# Patient Record
Sex: FEMALE | Race: WHITE | NOT HISPANIC OR LATINO | Employment: OTHER | RURAL
[De-identification: names, ages, dates, MRNs, and addresses within clinical notes are randomized per-mention and may not be internally consistent; named-entity substitution may affect disease eponyms.]

---

## 2020-07-28 ENCOUNTER — HISTORICAL (OUTPATIENT)
Dept: ADMINISTRATIVE | Facility: HOSPITAL | Age: 60
End: 2020-07-28

## 2020-08-20 ENCOUNTER — HISTORICAL (OUTPATIENT)
Dept: ADMINISTRATIVE | Facility: HOSPITAL | Age: 60
End: 2020-08-20

## 2020-08-21 LAB
INSULIN SERPL-ACNC: NORMAL U[IU]/ML
LAB AP CLINICAL INFORMATION: NORMAL
LAB AP COMMENTS: NORMAL
LAB AP DIAGNOSIS - HISTORICAL: NORMAL
LAB AP GROSS PATHOLOGY - HISTORICAL: NORMAL
LAB AP SPECIMEN SUBMITTED - HISTORICAL: NORMAL

## 2020-09-04 ENCOUNTER — HISTORICAL (OUTPATIENT)
Dept: ADMINISTRATIVE | Facility: HOSPITAL | Age: 60
End: 2020-09-04

## 2020-09-09 ENCOUNTER — HISTORICAL (OUTPATIENT)
Dept: ADMINISTRATIVE | Facility: HOSPITAL | Age: 60
End: 2020-09-09

## 2020-09-09 LAB — CRC RECOMMENDATION EXT: NORMAL

## 2020-09-10 LAB
GASTRIN SERPL-MCNC: 300 PG/ML
INSULIN SERPL-ACNC: NORMAL U[IU]/ML
LAB AP CLINICAL INFORMATION: NORMAL
LAB AP DIAGNOSIS - HISTORICAL: NORMAL
LAB AP GROSS PATHOLOGY - HISTORICAL: NORMAL
LAB AP SPECIMEN SUBMITTED - HISTORICAL: NORMAL

## 2020-09-11 LAB — PCA IGG SER-ACNC: 114.7 U

## 2020-09-25 ENCOUNTER — HISTORICAL (OUTPATIENT)
Dept: ADMINISTRATIVE | Facility: HOSPITAL | Age: 60
End: 2020-09-25

## 2020-09-25 LAB — VIT B12 SERPL-MCNC: 235 PG/ML (ref 193–986)

## 2021-06-10 RX ORDER — MELOXICAM 7.5 MG/1
7.5 TABLET ORAL DAILY PRN
COMMUNITY
End: 2023-06-20 | Stop reason: ALTCHOICE

## 2021-06-10 RX ORDER — LOSARTAN POTASSIUM 50 MG/1
50 TABLET ORAL DAILY
COMMUNITY
End: 2023-06-20 | Stop reason: SDUPTHER

## 2021-06-10 RX ORDER — OMEPRAZOLE 20 MG/1
20 CAPSULE, DELAYED RELEASE ORAL DAILY
COMMUNITY
End: 2021-12-14 | Stop reason: SDUPTHER

## 2021-06-10 RX ORDER — PRAVASTATIN SODIUM 20 MG/1
20 TABLET ORAL NIGHTLY
COMMUNITY
End: 2021-06-17

## 2021-06-10 RX ORDER — FERROUS SULFATE 325(65) MG
325 TABLET, DELAYED RELEASE (ENTERIC COATED) ORAL 2 TIMES DAILY
COMMUNITY
End: 2021-12-14

## 2021-12-14 ENCOUNTER — OFFICE VISIT (OUTPATIENT)
Dept: FAMILY MEDICINE | Facility: CLINIC | Age: 61
End: 2021-12-14
Payer: MEDICARE

## 2021-12-14 VITALS
HEIGHT: 67 IN | DIASTOLIC BLOOD PRESSURE: 84 MMHG | HEART RATE: 101 BPM | WEIGHT: 117.81 LBS | TEMPERATURE: 98 F | SYSTOLIC BLOOD PRESSURE: 138 MMHG | RESPIRATION RATE: 20 BRPM | OXYGEN SATURATION: 99 % | BODY MASS INDEX: 18.49 KG/M2

## 2021-12-14 DIAGNOSIS — Z12.31 ENCOUNTER FOR SCREENING MAMMOGRAM FOR MALIGNANT NEOPLASM OF BREAST: ICD-10-CM

## 2021-12-14 DIAGNOSIS — D64.9 ANEMIA, UNSPECIFIED TYPE: ICD-10-CM

## 2021-12-14 DIAGNOSIS — N30.00 ACUTE CYSTITIS WITHOUT HEMATURIA: ICD-10-CM

## 2021-12-14 DIAGNOSIS — I10 HYPERTENSION, UNSPECIFIED TYPE: ICD-10-CM

## 2021-12-14 DIAGNOSIS — Z12.39 ENCOUNTER FOR SCREENING FOR MALIGNANT NEOPLASM OF BREAST, UNSPECIFIED SCREENING MODALITY: ICD-10-CM

## 2021-12-14 DIAGNOSIS — R30.0 DYSURIA: ICD-10-CM

## 2021-12-14 DIAGNOSIS — R10.13 EPIGASTRIC PAIN: Primary | ICD-10-CM

## 2021-12-14 LAB
ALBUMIN SERPL BCP-MCNC: 3.7 G/DL (ref 3.5–5)
ALBUMIN/GLOB SERPL: 0.9 {RATIO}
ALP SERPL-CCNC: 94 U/L (ref 50–130)
ALT SERPL W P-5'-P-CCNC: 32 U/L (ref 13–56)
ANION GAP SERPL CALCULATED.3IONS-SCNC: 9 MMOL/L (ref 7–16)
ANISOCYTOSIS BLD QL SMEAR: ABNORMAL
AST SERPL W P-5'-P-CCNC: 22 U/L (ref 15–37)
BASOPHILS # BLD AUTO: 0.07 K/UL (ref 0–0.2)
BASOPHILS NFR BLD AUTO: 1.5 % (ref 0–1)
BILIRUB SERPL-MCNC: 0.5 MG/DL (ref 0–1.2)
BILIRUB SERPL-MCNC: NEGATIVE MG/DL
BLOOD URINE, POC: NEGATIVE
BUN SERPL-MCNC: 16 MG/DL (ref 7–18)
BUN/CREAT SERPL: 24 (ref 6–20)
CALCIUM SERPL-MCNC: 8.8 MG/DL (ref 8.5–10.1)
CHLORIDE SERPL-SCNC: 108 MMOL/L (ref 98–107)
CHOLEST SERPL-MCNC: 205 MG/DL (ref 0–200)
CHOLEST/HDLC SERPL: 3.7 {RATIO}
CK SERPL-CCNC: 29 U/L (ref 26–192)
CO2 SERPL-SCNC: 28 MMOL/L (ref 21–32)
COLOR, POC UA: YELLOW
CREAT SERPL-MCNC: 0.68 MG/DL (ref 0.55–1.02)
DIFFERENTIAL METHOD BLD: ABNORMAL
EOSINOPHIL # BLD AUTO: 0.05 K/UL (ref 0–0.5)
EOSINOPHIL NFR BLD AUTO: 1.1 % (ref 1–4)
ERYTHROCYTE [DISTWIDTH] IN BLOOD BY AUTOMATED COUNT: 17.5 % (ref 11.5–14.5)
GLOBULIN SER-MCNC: 4 G/DL (ref 2–4)
GLUCOSE SERPL-MCNC: 96 MG/DL (ref 74–106)
GLUCOSE UR QL STRIP: NEGATIVE
HCT VFR BLD AUTO: 28.6 % (ref 38–47)
HDLC SERPL-MCNC: 55 MG/DL (ref 40–60)
HGB BLD-MCNC: 7.9 G/DL (ref 12–16)
HYPOCHROMIA BLD QL SMEAR: ABNORMAL
IMM GRANULOCYTES # BLD AUTO: 0.01 K/UL (ref 0–0.04)
IMM GRANULOCYTES NFR BLD: 0.2 % (ref 0–0.4)
KETONES UR QL STRIP: NEGATIVE
LDLC SERPL CALC-MCNC: 126 MG/DL
LDLC/HDLC SERPL: 2.3 {RATIO}
LEUKOCYTE ESTERASE URINE, POC: ABNORMAL
LYMPHOCYTES # BLD AUTO: 1.12 K/UL (ref 1–4.8)
LYMPHOCYTES NFR BLD AUTO: 24.7 % (ref 27–41)
MCH RBC QN AUTO: 18.3 PG (ref 27–31)
MCHC RBC AUTO-ENTMCNC: 27.6 G/DL (ref 32–36)
MCV RBC AUTO: 66.4 FL (ref 80–96)
MICROCYTES BLD QL SMEAR: ABNORMAL
MONOCYTES # BLD AUTO: 0.37 K/UL (ref 0–0.8)
MONOCYTES NFR BLD AUTO: 8.1 % (ref 2–6)
MPC BLD CALC-MCNC: 11.6 FL (ref 9.4–12.4)
NEUTROPHILS # BLD AUTO: 2.92 K/UL (ref 1.8–7.7)
NEUTROPHILS NFR BLD AUTO: 64.4 % (ref 53–65)
NITRITE, POC UA: POSITIVE
NONHDLC SERPL-MCNC: 150 MG/DL
NRBC # BLD AUTO: 0 X10E3/UL
NRBC, AUTO (.00): 0 %
OVALOCYTES BLD QL SMEAR: ABNORMAL
PH, POC UA: 5.5
PLATELET # BLD AUTO: 425 K/UL (ref 150–400)
PLATELET MORPHOLOGY: ABNORMAL
POLYCHROMASIA BLD QL SMEAR: ABNORMAL
POTASSIUM SERPL-SCNC: 3.6 MMOL/L (ref 3.5–5.1)
PROT SERPL-MCNC: 7.7 G/DL (ref 6.4–8.2)
PROTEIN, POC: ABNORMAL
RBC # BLD AUTO: 4.31 M/UL (ref 4.2–5.4)
SCHISTOCYTES BLD QL AUTO: ABNORMAL
SODIUM SERPL-SCNC: 141 MMOL/L (ref 136–145)
SPECIFIC GRAVITY, POC UA: >1.03
TRIGL SERPL-MCNC: 121 MG/DL (ref 35–150)
UROBILINOGEN, POC UA: 0.2
VLDLC SERPL-MCNC: 24 MG/DL
WBC # BLD AUTO: 4.54 K/UL (ref 4.5–11)

## 2021-12-14 PROCEDURE — 80053 COMPREHEN METABOLIC PANEL: CPT | Mod: ,,, | Performed by: CLINICAL MEDICAL LABORATORY

## 2021-12-14 PROCEDURE — 82550 ASSAY OF CK (CPK): CPT | Mod: ,,, | Performed by: CLINICAL MEDICAL LABORATORY

## 2021-12-14 PROCEDURE — 85025 COMPLETE CBC W/AUTO DIFF WBC: CPT | Mod: ,,, | Performed by: CLINICAL MEDICAL LABORATORY

## 2021-12-14 PROCEDURE — 99214 PR OFFICE/OUTPT VISIT, EST, LEVL IV, 30-39 MIN: ICD-10-PCS | Mod: ,,, | Performed by: NURSE PRACTITIONER

## 2021-12-14 PROCEDURE — 80061 LIPID PANEL: ICD-10-PCS | Mod: ,,, | Performed by: CLINICAL MEDICAL LABORATORY

## 2021-12-14 PROCEDURE — 81003 URINALYSIS AUTO W/O SCOPE: CPT | Mod: RHCUB | Performed by: NURSE PRACTITIONER

## 2021-12-14 PROCEDURE — 80061 LIPID PANEL: CPT | Mod: ,,, | Performed by: CLINICAL MEDICAL LABORATORY

## 2021-12-14 PROCEDURE — 82550 CK: ICD-10-PCS | Mod: ,,, | Performed by: CLINICAL MEDICAL LABORATORY

## 2021-12-14 PROCEDURE — 4010F ACE/ARB THERAPY RXD/TAKEN: CPT | Mod: ,,, | Performed by: NURSE PRACTITIONER

## 2021-12-14 PROCEDURE — 99214 OFFICE O/P EST MOD 30 MIN: CPT | Mod: ,,, | Performed by: NURSE PRACTITIONER

## 2021-12-14 PROCEDURE — 80053 COMPREHENSIVE METABOLIC PANEL: ICD-10-PCS | Mod: ,,, | Performed by: CLINICAL MEDICAL LABORATORY

## 2021-12-14 PROCEDURE — 85025 CBC WITH DIFFERENTIAL: ICD-10-PCS | Mod: ,,, | Performed by: CLINICAL MEDICAL LABORATORY

## 2021-12-14 PROCEDURE — 4010F PR ACE/ARB THEARPY RXD/TAKEN: ICD-10-PCS | Mod: ,,, | Performed by: NURSE PRACTITIONER

## 2021-12-14 RX ORDER — NAPROXEN 500 MG/1
TABLET ORAL
COMMUNITY
End: 2023-06-20

## 2021-12-14 RX ORDER — METHOCARBAMOL 500 MG/1
TABLET, FILM COATED ORAL
COMMUNITY
End: 2023-06-20 | Stop reason: ALTCHOICE

## 2021-12-14 RX ORDER — NITROFURANTOIN (MACROCRYSTALS) 100 MG/1
CAPSULE ORAL
COMMUNITY
End: 2021-12-14 | Stop reason: ALTCHOICE

## 2021-12-14 RX ORDER — PRAVASTATIN SODIUM 20 MG/1
TABLET ORAL
COMMUNITY
End: 2021-12-14 | Stop reason: ALTCHOICE

## 2021-12-14 RX ORDER — NITROFURANTOIN 25; 75 MG/1; MG/1
100 CAPSULE ORAL 2 TIMES DAILY
Qty: 20 CAPSULE | Refills: 0 | Status: SHIPPED | OUTPATIENT
Start: 2021-12-14 | End: 2022-09-28 | Stop reason: ALTCHOICE

## 2021-12-14 RX ORDER — OMEPRAZOLE 20 MG/1
CAPSULE, DELAYED RELEASE ORAL
Qty: 45 CAPSULE | Refills: 5 | Status: SHIPPED | OUTPATIENT
Start: 2021-12-14 | End: 2022-08-31 | Stop reason: SDUPTHER

## 2021-12-14 RX ORDER — LOSARTAN POTASSIUM 25 MG/1
TABLET ORAL
COMMUNITY
End: 2021-12-14 | Stop reason: ALTCHOICE

## 2022-08-31 ENCOUNTER — OFFICE VISIT (OUTPATIENT)
Dept: FAMILY MEDICINE | Facility: CLINIC | Age: 62
End: 2022-08-31
Payer: MEDICAID

## 2022-08-31 VITALS
BODY MASS INDEX: 18.83 KG/M2 | HEART RATE: 108 BPM | RESPIRATION RATE: 20 BRPM | DIASTOLIC BLOOD PRESSURE: 80 MMHG | WEIGHT: 120 LBS | TEMPERATURE: 99 F | OXYGEN SATURATION: 98 % | HEIGHT: 67 IN | SYSTOLIC BLOOD PRESSURE: 134 MMHG

## 2022-08-31 DIAGNOSIS — K21.9 GASTROESOPHAGEAL REFLUX DISEASE WITHOUT ESOPHAGITIS: ICD-10-CM

## 2022-08-31 DIAGNOSIS — E78.00 HYPERCHOLESTEREMIA: ICD-10-CM

## 2022-08-31 DIAGNOSIS — D64.9 ANEMIA, UNSPECIFIED TYPE: ICD-10-CM

## 2022-08-31 DIAGNOSIS — S29.012A MUSCLE STRAIN OF LEFT UPPER BACK, INITIAL ENCOUNTER: ICD-10-CM

## 2022-08-31 DIAGNOSIS — G25.81 RESTLESS LEG SYNDROME: ICD-10-CM

## 2022-08-31 DIAGNOSIS — I10 HYPERTENSION, UNSPECIFIED TYPE: Primary | ICD-10-CM

## 2022-08-31 LAB
ALBUMIN SERPL BCP-MCNC: 4.2 G/DL (ref 3.5–5)
ALBUMIN/GLOB SERPL: 1.2 {RATIO}
ALP SERPL-CCNC: 85 U/L (ref 50–130)
ALT SERPL W P-5'-P-CCNC: 32 U/L (ref 13–56)
ANION GAP SERPL CALCULATED.3IONS-SCNC: 12 MMOL/L (ref 7–16)
ANISOCYTOSIS BLD QL SMEAR: ABNORMAL
AST SERPL W P-5'-P-CCNC: 23 U/L (ref 15–37)
BASOPHILS # BLD AUTO: 0.06 K/UL (ref 0–0.2)
BASOPHILS NFR BLD AUTO: 1.3 % (ref 0–1)
BILIRUB SERPL-MCNC: 0.5 MG/DL (ref ?–1.2)
BUN SERPL-MCNC: 13 MG/DL (ref 7–18)
BUN/CREAT SERPL: 18 (ref 6–20)
CALCIUM SERPL-MCNC: 9.3 MG/DL (ref 8.5–10.1)
CHLORIDE SERPL-SCNC: 105 MMOL/L (ref 98–107)
CHOLEST SERPL-MCNC: 198 MG/DL (ref 0–200)
CHOLEST/HDLC SERPL: 3.2 {RATIO}
CK SERPL-CCNC: 37 U/L (ref 26–192)
CO2 SERPL-SCNC: 26 MMOL/L (ref 21–32)
CREAT SERPL-MCNC: 0.74 MG/DL (ref 0.55–1.02)
DIFFERENTIAL METHOD BLD: ABNORMAL
EGFR (NO RACE VARIABLE) (RUSH/TITUS): 91 ML/MIN/1.73M²
EOSINOPHIL # BLD AUTO: 0.03 K/UL (ref 0–0.5)
EOSINOPHIL NFR BLD AUTO: 0.7 % (ref 1–4)
ERYTHROCYTE [DISTWIDTH] IN BLOOD BY AUTOMATED COUNT: 18.7 % (ref 11.5–14.5)
GLOBULIN SER-MCNC: 3.5 G/DL (ref 2–4)
GLUCOSE SERPL-MCNC: 112 MG/DL (ref 74–106)
HCT VFR BLD AUTO: 29.4 % (ref 38–47)
HDLC SERPL-MCNC: 62 MG/DL (ref 40–60)
HGB BLD-MCNC: 7.9 G/DL (ref 12–16)
HYPOCHROMIA BLD QL SMEAR: ABNORMAL
IMM GRANULOCYTES # BLD AUTO: 0.02 K/UL (ref 0–0.04)
IMM GRANULOCYTES NFR BLD: 0.4 % (ref 0–0.4)
LDLC SERPL CALC-MCNC: 109 MG/DL
LDLC/HDLC SERPL: 1.8 {RATIO}
LYMPHOCYTES # BLD AUTO: 1.17 K/UL (ref 1–4.8)
LYMPHOCYTES NFR BLD AUTO: 25.8 % (ref 27–41)
MCH RBC QN AUTO: 17.8 PG (ref 27–31)
MCHC RBC AUTO-ENTMCNC: 26.9 G/DL (ref 32–36)
MCV RBC AUTO: 66.1 FL (ref 80–96)
MICROCYTES BLD QL SMEAR: ABNORMAL
MONOCYTES # BLD AUTO: 0.4 K/UL (ref 0–0.8)
MONOCYTES NFR BLD AUTO: 8.8 % (ref 2–6)
MPC BLD CALC-MCNC: 12 FL (ref 9.4–12.4)
NEUTROPHILS # BLD AUTO: 2.85 K/UL (ref 1.8–7.7)
NEUTROPHILS NFR BLD AUTO: 63 % (ref 53–65)
NONHDLC SERPL-MCNC: 136 MG/DL
NRBC # BLD AUTO: 0 X10E3/UL
NRBC, AUTO (.00): 0 %
OVALOCYTES BLD QL SMEAR: ABNORMAL
PLATELET # BLD AUTO: 497 K/UL (ref 150–400)
PLATELET MORPHOLOGY: ABNORMAL
POLYCHROMASIA BLD QL SMEAR: ABNORMAL
POTASSIUM SERPL-SCNC: 4.6 MMOL/L (ref 3.5–5.1)
PROT SERPL-MCNC: 7.7 G/DL (ref 6.4–8.2)
RBC # BLD AUTO: 4.45 M/UL (ref 4.2–5.4)
SCHISTOCYTES BLD QL AUTO: ABNORMAL
SODIUM SERPL-SCNC: 138 MMOL/L (ref 136–145)
TRIGL SERPL-MCNC: 133 MG/DL (ref 35–150)
VLDLC SERPL-MCNC: 27 MG/DL
WBC # BLD AUTO: 4.53 K/UL (ref 4.5–11)

## 2022-08-31 PROCEDURE — 3075F PR MOST RECENT SYSTOLIC BLOOD PRESS GE 130-139MM HG: ICD-10-PCS | Mod: ,,, | Performed by: NURSE PRACTITIONER

## 2022-08-31 PROCEDURE — 85025 COMPLETE CBC W/AUTO DIFF WBC: CPT | Mod: ,,, | Performed by: CLINICAL MEDICAL LABORATORY

## 2022-08-31 PROCEDURE — 82550 ASSAY OF CK (CPK): CPT | Mod: ,,, | Performed by: CLINICAL MEDICAL LABORATORY

## 2022-08-31 PROCEDURE — 80053 COMPREHEN METABOLIC PANEL: CPT | Mod: ,,, | Performed by: CLINICAL MEDICAL LABORATORY

## 2022-08-31 PROCEDURE — 3008F BODY MASS INDEX DOCD: CPT | Mod: ,,, | Performed by: NURSE PRACTITIONER

## 2022-08-31 PROCEDURE — 80053 COMPREHENSIVE METABOLIC PANEL: ICD-10-PCS | Mod: ,,, | Performed by: CLINICAL MEDICAL LABORATORY

## 2022-08-31 PROCEDURE — 3079F PR MOST RECENT DIASTOLIC BLOOD PRESSURE 80-89 MM HG: ICD-10-PCS | Mod: ,,, | Performed by: NURSE PRACTITIONER

## 2022-08-31 PROCEDURE — 3075F SYST BP GE 130 - 139MM HG: CPT | Mod: ,,, | Performed by: NURSE PRACTITIONER

## 2022-08-31 PROCEDURE — 36415 COLL VENOUS BLD VENIPUNCTURE: CPT | Mod: ,,, | Performed by: CLINICAL MEDICAL LABORATORY

## 2022-08-31 PROCEDURE — 36415 PR COLLECTION VENOUS BLOOD,VENIPUNCTURE: ICD-10-PCS | Mod: ,,, | Performed by: CLINICAL MEDICAL LABORATORY

## 2022-08-31 PROCEDURE — 99214 PR OFFICE/OUTPT VISIT, EST, LEVL IV, 30-39 MIN: ICD-10-PCS | Mod: ,,, | Performed by: NURSE PRACTITIONER

## 2022-08-31 PROCEDURE — 3079F DIAST BP 80-89 MM HG: CPT | Mod: ,,, | Performed by: NURSE PRACTITIONER

## 2022-08-31 PROCEDURE — 3008F PR BODY MASS INDEX (BMI) DOCUMENTED: ICD-10-PCS | Mod: ,,, | Performed by: NURSE PRACTITIONER

## 2022-08-31 PROCEDURE — 1159F PR MEDICATION LIST DOCUMENTED IN MEDICAL RECORD: ICD-10-PCS | Mod: ,,, | Performed by: NURSE PRACTITIONER

## 2022-08-31 PROCEDURE — 80061 LIPID PANEL: ICD-10-PCS | Mod: ,,, | Performed by: CLINICAL MEDICAL LABORATORY

## 2022-08-31 PROCEDURE — 82550 CK: ICD-10-PCS | Mod: ,,, | Performed by: CLINICAL MEDICAL LABORATORY

## 2022-08-31 PROCEDURE — 1159F MED LIST DOCD IN RCRD: CPT | Mod: ,,, | Performed by: NURSE PRACTITIONER

## 2022-08-31 PROCEDURE — 85025 CBC WITH DIFFERENTIAL: ICD-10-PCS | Mod: ,,, | Performed by: CLINICAL MEDICAL LABORATORY

## 2022-08-31 PROCEDURE — 99214 OFFICE O/P EST MOD 30 MIN: CPT | Mod: ,,, | Performed by: NURSE PRACTITIONER

## 2022-08-31 PROCEDURE — 80061 LIPID PANEL: CPT | Mod: ,,, | Performed by: CLINICAL MEDICAL LABORATORY

## 2022-08-31 RX ORDER — DICLOFENAC SODIUM 75 MG/1
75 TABLET, DELAYED RELEASE ORAL 2 TIMES DAILY
Qty: 30 TABLET | Refills: 0 | Status: SHIPPED | OUTPATIENT
Start: 2022-08-31 | End: 2023-06-07

## 2022-08-31 RX ORDER — PRAVASTATIN SODIUM 20 MG/1
20 TABLET ORAL DAILY
Qty: 90 TABLET | Refills: 1 | Status: SHIPPED | OUTPATIENT
Start: 2022-08-31 | End: 2023-04-19

## 2022-08-31 RX ORDER — CYCLOBENZAPRINE HCL 5 MG
5 TABLET ORAL 3 TIMES DAILY PRN
Qty: 30 TABLET | Refills: 0 | Status: SHIPPED | OUTPATIENT
Start: 2022-08-31 | End: 2022-09-10

## 2022-08-31 RX ORDER — PRAMIPEXOLE DIHYDROCHLORIDE 0.12 MG/1
TABLET ORAL
Qty: 49 TABLET | Refills: 0 | Status: SHIPPED | OUTPATIENT
Start: 2022-08-31 | End: 2022-09-28 | Stop reason: SDUPTHER

## 2022-08-31 RX ORDER — OMEPRAZOLE 20 MG/1
CAPSULE, DELAYED RELEASE ORAL
Qty: 90 CAPSULE | Refills: 1 | Status: SHIPPED | OUTPATIENT
Start: 2022-08-31 | End: 2023-04-19

## 2022-08-31 RX ORDER — PRAVASTATIN SODIUM 20 MG/1
20 TABLET ORAL DAILY
COMMUNITY
End: 2022-08-31 | Stop reason: SDUPTHER

## 2022-08-31 NOTE — PROGRESS NOTES
Sheba Grant NP   [facility]  [address] [address] beds at the [facility] [address]  [place]     PATIENT NAME: Arlette Cisneros  : 1960  DATE: 22  MRN: [id number]      Billing Provider: Sheba Grant NP  Level of Service:   Patient PCP Information       Provider PCP Type    Sheba Grant NP General            Reason for Visit / Chief Complaint: Hypertension (Here for checkup -  need refills on medications and labs) and Back Pain (C/o left lower side and back pain x 2 wks.  Denies any injury)       Update PCP  Update Chief Complaint         History of Present Illness / Problem Focused Workflow     Arlette Cisneros presents to the clinic here for htn, and also c/o back pain, left lower back pain and side pain x 2 weeks.       Review of Systems     Review of Systems   Constitutional:  Negative for chills, fatigue and fever.   HENT:  Negative for nasal congestion, ear pain, facial swelling, hearing loss, mouth dryness, mouth sores, postnasal drip, rhinorrhea, sinus pressure/congestion and goiter.    Eyes:  Negative for discharge and itching.   Respiratory:  Negative for cough, shortness of breath and wheezing.    Cardiovascular:  Negative for chest pain and leg swelling.   Gastrointestinal:  Negative for abdominal pain, change in bowel habit and change in bowel habit.   Genitourinary:  Negative for difficulty urinating, dysuria, enuresis, frequency, hematuria and urgency.   Musculoskeletal:  Positive for back pain and leg pain (and movement at night).        Left lat back pain   Neurological:  Negative for dizziness, vertigo, syncope, weakness and headaches.   Psychiatric/Behavioral:  Negative for decreased concentration.       Medical / Social / Family History     Past Medical History:   Diagnosis Date    Anemia     Cervical radiculopathy 2018    Degenerative joint disease involving multiple joints 2020    Hypercholesteremia 2021    Hypertension     Restrictive lung disease 2021    Temporomandibular  "joint disorder 04/30/2018       Past Surgical History:   Procedure Laterality Date    APPENDECTOMY      HYSTERECTOMY         Social History  Ms.  reports that she has never smoked. She has never used smokeless tobacco. She reports that she does not drink alcohol and does not use drugs.    Family History  Ms.'s family history includes Cancer in her father; Heart disease in her mother; Kidney disease in her father; Stroke in her mother.    Medications and Allergies     Medications  Outpatient Medications Marked as Taking for the 8/31/22 encounter (Office Visit) with Sheba Grant NP   Medication Sig Dispense Refill    [DISCONTINUED] omeprazole (PRILOSEC) 20 MG capsule 1 tablet po bid x 2 weeks and then may decrfease to daily 45 capsule 5    [DISCONTINUED] pravastatin (PRAVACHOL) 20 MG tablet Take 20 mg by mouth once daily.         Allergies  Review of patient's allergies indicates:   Allergen Reactions    Codeine sulfate Shortness Of Breath       Physical Examination     Vitals:    08/31/22 1459   BP: 134/80   BP Location: Left arm   Patient Position: Sitting   BP Method: Medium (Manual)   Pulse: 108   Resp: 20   Temp: 99.3 °F (37.4 °C)   TempSrc: Oral   SpO2: 98%   Weight: 54.4 kg (120 lb)   Height: 5' 6.54" (1.69 m)      Physical Exam  Vitals and nursing note reviewed.   Constitutional:       Appearance: Normal appearance.   HENT:      Head: Normocephalic.      Right Ear: Tympanic membrane, ear canal and external ear normal.      Left Ear: Tympanic membrane, ear canal and external ear normal.      Nose: Nose normal.      Mouth/Throat:      Mouth: Mucous membranes are moist.      Pharynx: Oropharynx is clear.   Eyes:      Extraocular Movements: Extraocular movements intact.      Conjunctiva/sclera: Conjunctivae normal.      Pupils: Pupils are equal, round, and reactive to light.   Cardiovascular:      Rate and Rhythm: Normal rate and regular rhythm.      Pulses: Normal pulses.      Heart sounds: Normal heart " sounds.   Pulmonary:      Effort: Pulmonary effort is normal.      Breath sounds: Normal breath sounds.   Abdominal:      General: Bowel sounds are normal.      Palpations: Abdomen is soft.   Musculoskeletal:         General: Normal range of motion.      Cervical back: Normal range of motion and neck supple.      Comments: Mild tenderness left lat back, mild muscle spasm   Skin:     General: Skin is warm and dry.      Capillary Refill: Capillary refill takes less than 2 seconds.   Neurological:      General: No focal deficit present.      Mental Status: She is alert and oriented to person, place, and time.   Psychiatric:         Mood and Affect: Mood normal.         Behavior: Behavior normal.        Assessment and Plan (including Health Maintenance)      Problem List  Smart Sets  Document Outside HM   :    Plan: alternate heat and cold to back several times per day for comfort, avoid lifting more than 5 pounds for 1 weeks, will try mirapex for restless legs, meds as ordered, return to clnic as needed and as scheduled    Hypertension, unspecified type  -     Comprehensive Metabolic Panel; Future; Expected date: 08/31/2022  -     CK; Future; Expected date: 08/31/2022    Anemia, unspecified type  -     CBC Auto Differential; Future; Expected date: 08/31/2022    Hypercholesteremia  -     Comprehensive Metabolic Panel; Future; Expected date: 08/31/2022  -     CK; Future; Expected date: 08/31/2022  -     Lipid Panel; Future; Expected date: 08/31/2022  -     pravastatin (PRAVACHOL) 20 MG tablet; Take 1 tablet (20 mg total) by mouth once daily.  Dispense: 90 tablet; Refill: 1    Restless leg syndrome  -     pramipexole (MIRAPEX) 0.125 MG tablet; 1 po hs x 1 week, then may increase to 2 po hs if needed.  Dispense: 49 tablet; Refill: 0    Gastroesophageal reflux disease without esophagitis  -     omeprazole (PRILOSEC) 20 MG capsule; 1 tablet po bid x 2 weeks and then may decrfease to daily  Dispense: 90 capsule; Refill:  1    Muscle strain  -     diclofenac (VOLTAREN) 75 MG EC tablet; Take 1 tablet (75 mg total) by mouth 2 (two) times daily.  Dispense: 30 tablet; Refill: 0  -     cyclobenzaprine (FLEXERIL) 5 MG tablet; Take 1 tablet (5 mg total) by mouth 3 (three) times daily as needed for Muscle spasms. Do not drive, mix etoh, or operate any heavy equipment while taking med  Dispense: 30 tablet; Refill: 0          Health Maintenance Due   Topic Date Due    Hepatitis C Screening  Never done    HIV Screening  Never done    TETANUS VACCINE  Never done    Mammogram  Never done    Colorectal Cancer Screening  Never done    Shingles Vaccine (1 of 2) Never done       Problem List Items Addressed This Visit          Cardiac/Vascular    Hypertension - Primary    Relevant Orders    Comprehensive Metabolic Panel    CK       Oncology    Anemia    Relevant Orders    CBC Auto Differential     Other Visit Diagnoses       Hypercholesteremia        Relevant Medications    pravastatin (PRAVACHOL) 20 MG tablet    Other Relevant Orders    Comprehensive Metabolic Panel    CK    Lipid Panel    Restless leg syndrome        Relevant Medications    pramipexole (MIRAPEX) 0.125 MG tablet    Gastroesophageal reflux disease without esophagitis        Relevant Medications    omeprazole (PRILOSEC) 20 MG capsule    Muscle strain        Relevant Medications    diclofenac (VOLTAREN) 75 MG EC tablet    cyclobenzaprine (FLEXERIL) 5 MG tablet              Health Maintenance Topics with due status: Not Due       Topic Last Completion Date    Lipid Panel 12/14/2021    Influenza Vaccine Not Due       Procedures     Future Appointments   Date Time Provider Department Center   9/28/2022  2:45 PM Sheba Grant NP [facility]        Follow up in about 1 month (around 9/30/2022).       Signature:  Sheba Grant NP    Date of encounter: 8/31/22

## 2022-09-01 NOTE — PROGRESS NOTES
Notify that h/h is low but not changed since 8 months ago, increae irone in diet, and recheck in 3 months, cmp good, recheck in 6 months

## 2022-09-02 DIAGNOSIS — D64.9 ANEMIA, UNSPECIFIED TYPE: Primary | ICD-10-CM

## 2022-09-28 ENCOUNTER — OFFICE VISIT (OUTPATIENT)
Dept: FAMILY MEDICINE | Facility: CLINIC | Age: 62
End: 2022-09-28
Payer: MEDICARE

## 2022-09-28 VITALS
SYSTOLIC BLOOD PRESSURE: 128 MMHG | HEIGHT: 68 IN | BODY MASS INDEX: 18.34 KG/M2 | HEART RATE: 80 BPM | WEIGHT: 121 LBS | DIASTOLIC BLOOD PRESSURE: 74 MMHG | OXYGEN SATURATION: 99 % | TEMPERATURE: 97 F | RESPIRATION RATE: 18 BRPM

## 2022-09-28 DIAGNOSIS — D64.9 ANEMIA, UNSPECIFIED TYPE: ICD-10-CM

## 2022-09-28 DIAGNOSIS — M54.6 ACUTE LEFT-SIDED THORACIC BACK PAIN: Primary | ICD-10-CM

## 2022-09-28 DIAGNOSIS — R53.82 CHRONIC FATIGUE: ICD-10-CM

## 2022-09-28 DIAGNOSIS — G25.81 RESTLESS LEG SYNDROME: ICD-10-CM

## 2022-09-28 DIAGNOSIS — J98.4 RESTRICTIVE LUNG DISEASE: ICD-10-CM

## 2022-09-28 LAB
BASOPHILS # BLD AUTO: 0.07 K/UL (ref 0–0.2)
BASOPHILS NFR BLD AUTO: 1.5 % (ref 0–1)
DIFFERENTIAL METHOD BLD: ABNORMAL
EOSINOPHIL # BLD AUTO: 0.05 K/UL (ref 0–0.5)
EOSINOPHIL NFR BLD AUTO: 1.1 % (ref 1–4)
ERYTHROCYTE [DISTWIDTH] IN BLOOD BY AUTOMATED COUNT: 18.6 % (ref 11.5–14.5)
EST. AVERAGE GLUCOSE BLD GHB EST-MCNC: 110 MG/DL
FOLATE SERPL-MCNC: 17.3 NG/ML (ref 3.1–17.5)
HBA1C MFR BLD HPLC: 5.9 % (ref 4.5–6.6)
HCT VFR BLD AUTO: 29.2 % (ref 38–47)
HGB BLD-MCNC: 7.8 G/DL (ref 12–16)
HYPOCHROMIA BLD QL SMEAR: NORMAL
IMM GRANULOCYTES # BLD AUTO: 0.02 K/UL (ref 0–0.04)
IMM GRANULOCYTES NFR BLD: 0.4 % (ref 0–0.4)
LYMPHOCYTES # BLD AUTO: 1.27 K/UL (ref 1–4.8)
LYMPHOCYTES NFR BLD AUTO: 26.8 % (ref 27–41)
MCH RBC QN AUTO: 17.7 PG (ref 27–31)
MCHC RBC AUTO-ENTMCNC: 26.7 G/DL (ref 32–36)
MCV RBC AUTO: 66.4 FL (ref 80–96)
MICROCYTES BLD QL SMEAR: NORMAL
MONOCYTES # BLD AUTO: 0.46 K/UL (ref 0–0.8)
MONOCYTES NFR BLD AUTO: 9.7 % (ref 2–6)
MPC BLD CALC-MCNC: 11.7 FL (ref 9.4–12.4)
NEUTROPHILS # BLD AUTO: 2.86 K/UL (ref 1.8–7.7)
NEUTROPHILS NFR BLD AUTO: 60.5 % (ref 53–65)
NRBC # BLD AUTO: 0 X10E3/UL
NRBC, AUTO (.00): 0 %
PLATELET # BLD AUTO: 461 K/UL (ref 150–400)
PLATELET MORPHOLOGY: NORMAL
RBC # BLD AUTO: 4.4 M/UL (ref 4.2–5.4)
VIT B12 SERPL-MCNC: 629 PG/ML (ref 193–986)
WBC # BLD AUTO: 4.73 K/UL (ref 4.5–11)

## 2022-09-28 PROCEDURE — 82607 VITAMIN B12/FOLATE, SERUM PANEL: ICD-10-PCS | Mod: GZ,,, | Performed by: CLINICAL MEDICAL LABORATORY

## 2022-09-28 PROCEDURE — 82746 VITAMIN B12/FOLATE, SERUM PANEL: ICD-10-PCS | Mod: GZ,,, | Performed by: CLINICAL MEDICAL LABORATORY

## 2022-09-28 PROCEDURE — 85025 CBC WITH DIFFERENTIAL: ICD-10-PCS | Mod: ,,, | Performed by: CLINICAL MEDICAL LABORATORY

## 2022-09-28 PROCEDURE — 1159F MED LIST DOCD IN RCRD: CPT | Mod: ,,, | Performed by: NURSE PRACTITIONER

## 2022-09-28 PROCEDURE — 3074F PR MOST RECENT SYSTOLIC BLOOD PRESSURE < 130 MM HG: ICD-10-PCS | Mod: ,,, | Performed by: NURSE PRACTITIONER

## 2022-09-28 PROCEDURE — 3078F DIAST BP <80 MM HG: CPT | Mod: ,,, | Performed by: NURSE PRACTITIONER

## 2022-09-28 PROCEDURE — 85025 COMPLETE CBC W/AUTO DIFF WBC: CPT | Mod: ,,, | Performed by: CLINICAL MEDICAL LABORATORY

## 2022-09-28 PROCEDURE — 99214 OFFICE O/P EST MOD 30 MIN: CPT | Mod: 25,,, | Performed by: NURSE PRACTITIONER

## 2022-09-28 PROCEDURE — 1159F PR MEDICATION LIST DOCUMENTED IN MEDICAL RECORD: ICD-10-PCS | Mod: ,,, | Performed by: NURSE PRACTITIONER

## 2022-09-28 PROCEDURE — 96372 THER/PROPH/DIAG INJ SC/IM: CPT | Mod: ,,, | Performed by: NURSE PRACTITIONER

## 2022-09-28 PROCEDURE — 96372 PR INJECTION,THERAP/PROPH/DIAG2ST, IM OR SUBCUT: ICD-10-PCS | Mod: ,,, | Performed by: NURSE PRACTITIONER

## 2022-09-28 PROCEDURE — 3078F PR MOST RECENT DIASTOLIC BLOOD PRESSURE < 80 MM HG: ICD-10-PCS | Mod: ,,, | Performed by: NURSE PRACTITIONER

## 2022-09-28 PROCEDURE — 82607 VITAMIN B-12: CPT | Mod: GZ,,, | Performed by: CLINICAL MEDICAL LABORATORY

## 2022-09-28 PROCEDURE — 3008F PR BODY MASS INDEX (BMI) DOCUMENTED: ICD-10-PCS | Mod: ,,, | Performed by: NURSE PRACTITIONER

## 2022-09-28 PROCEDURE — 82746 ASSAY OF FOLIC ACID SERUM: CPT | Mod: GZ,,, | Performed by: CLINICAL MEDICAL LABORATORY

## 2022-09-28 PROCEDURE — 83036 HEMOGLOBIN GLYCOSYLATED A1C: CPT | Mod: GZ,,, | Performed by: CLINICAL MEDICAL LABORATORY

## 2022-09-28 PROCEDURE — 99214 PR OFFICE/OUTPT VISIT, EST, LEVL IV, 30-39 MIN: ICD-10-PCS | Mod: 25,,, | Performed by: NURSE PRACTITIONER

## 2022-09-28 PROCEDURE — 83036 HEMOGLOBIN A1C: ICD-10-PCS | Mod: GZ,,, | Performed by: CLINICAL MEDICAL LABORATORY

## 2022-09-28 PROCEDURE — 3008F BODY MASS INDEX DOCD: CPT | Mod: ,,, | Performed by: NURSE PRACTITIONER

## 2022-09-28 PROCEDURE — 3074F SYST BP LT 130 MM HG: CPT | Mod: ,,, | Performed by: NURSE PRACTITIONER

## 2022-09-28 RX ORDER — DICLOFENAC SODIUM 75 MG/1
75 TABLET, DELAYED RELEASE ORAL 2 TIMES DAILY PRN
Qty: 60 TABLET | Refills: 0 | Status: SHIPPED | OUTPATIENT
Start: 2022-09-28 | End: 2023-06-07

## 2022-09-28 RX ORDER — METHYLPREDNISOLONE ACETATE 40 MG/ML
40 INJECTION, SUSPENSION INTRA-ARTICULAR; INTRALESIONAL; INTRAMUSCULAR; SOFT TISSUE
Status: COMPLETED | OUTPATIENT
Start: 2022-09-28 | End: 2022-09-28

## 2022-09-28 RX ORDER — DEXAMETHASONE SODIUM PHOSPHATE 4 MG/ML
4 INJECTION, SOLUTION INTRA-ARTICULAR; INTRALESIONAL; INTRAMUSCULAR; INTRAVENOUS; SOFT TISSUE
Status: COMPLETED | OUTPATIENT
Start: 2022-09-28 | End: 2022-09-28

## 2022-09-28 RX ORDER — PRAMIPEXOLE DIHYDROCHLORIDE 0.12 MG/1
TABLET ORAL
Qty: 180 TABLET | Refills: 1 | Status: SHIPPED | OUTPATIENT
Start: 2022-09-28 | End: 2023-06-20 | Stop reason: ALTCHOICE

## 2022-09-28 RX ADMIN — DEXAMETHASONE SODIUM PHOSPHATE 4 MG: 4 INJECTION, SOLUTION INTRA-ARTICULAR; INTRALESIONAL; INTRAMUSCULAR; INTRAVENOUS; SOFT TISSUE at 03:09

## 2022-09-28 RX ADMIN — METHYLPREDNISOLONE ACETATE 40 MG: 40 INJECTION, SUSPENSION INTRA-ARTICULAR; INTRALESIONAL; INTRAMUSCULAR; SOFT TISSUE at 03:09

## 2022-09-28 NOTE — PROGRESS NOTES
Sheba Grant NP   [facility]  [address] [facility] [address]  [place]     PATIENT NAME: Arlette Cisneros  : 1960  DATE: 22  MRN: [id number]      Billing Provider: Sheba Grant NP  Level of Service:   Patient PCP Information       Provider PCP Type    Sheba Grant NP General            Reason for Visit / Chief Complaint: Follow-up (Restless legs, HTN, back pain)       Update PCP  Update Chief Complaint         History of Present Illness / Problem Focused Workflow     Arlette Cisneros presents to the clinic here for eval of rls, htn, back pain. Also c/o fatigue, states not eating real well      Review of Systems     Review of Systems   Constitutional:  Positive for fatigue. Negative for chills and fever.   HENT:  Negative for nasal congestion, ear pain, facial swelling, hearing loss, mouth dryness, mouth sores, postnasal drip, rhinorrhea, sinus pressure/congestion and goiter.    Eyes:  Negative for discharge and itching.   Respiratory:  Negative for cough, shortness of breath and wheezing.    Cardiovascular:  Negative for chest pain and leg swelling.   Gastrointestinal:  Negative for abdominal pain, change in bowel habit and change in bowel habit.   Genitourinary:  Negative for difficulty urinating, dysuria, enuresis, frequency, hematuria and urgency.   Musculoskeletal:  Positive for back pain (left lateral back pain).   Neurological:  Negative for dizziness, vertigo, syncope, weakness and headaches.   Psychiatric/Behavioral:  Negative for decreased concentration.       Medical / Social / Family History     Past Medical History:   Diagnosis Date    Anemia     Cervical radiculopathy 2018    Degenerative joint disease involving multiple joints 2020    Hypercholesteremia 2021    Hypertension     Restrictive lung disease 2021    Temporomandibular joint disorder 2018       Past Surgical History:   Procedure Laterality Date    APPENDECTOMY      HYSTERECTOMY         Social  "History  Ms.  reports that she has never smoked. She has never used smokeless tobacco. She reports that she does not drink alcohol and does not use drugs.    Family History  Ms.'s family history includes Cancer in her father; Heart disease in her mother; Kidney disease in her father; Stroke in her mother.    Medications and Allergies     Medications  Outpatient Medications Marked as Taking for the 9/28/22 encounter (Office Visit) with Sheba Grant NP   Medication Sig Dispense Refill    diclofenac (VOLTAREN) 75 MG EC tablet Take 1 tablet (75 mg total) by mouth 2 (two) times daily. 30 tablet 0    losartan (COZAAR) 50 MG tablet Take 50 mg by mouth once daily.      meloxicam (MOBIC) 7.5 MG tablet Take 7.5 mg by mouth daily as needed for Pain.      methocarbamoL (ROBAXIN) 500 MG Tab 1 tablet      naproxen (NAPROSYN) 500 MG tablet 1 tablet with food or milk as needed      omeprazole (PRILOSEC) 20 MG capsule 1 tablet po bid x 2 weeks and then may decrfease to daily 90 capsule 1    pramipexole (MIRAPEX) 0.125 MG tablet 1 po hs x 1 week, then may increase to 2 po hs if needed. 49 tablet 0    pravastatin (PRAVACHOL) 20 MG tablet Take 1 tablet (20 mg total) by mouth once daily. 90 tablet 1       Allergies  Review of patient's allergies indicates:   Allergen Reactions    Codeine sulfate Shortness Of Breath       Physical Examination     Vitals:    09/28/22 1450   BP: 128/74   BP Location: Left arm   Patient Position: Sitting   Pulse: 80   Resp: 18   Temp: 97.4 °F (36.3 °C)   TempSrc: Oral   SpO2: 99%   Weight: 54.9 kg (121 lb)   Height: 5' 8" (1.727 m)      Physical Exam  Vitals and nursing note reviewed.   Constitutional:       Appearance: Normal appearance.   HENT:      Head: Normocephalic.      Right Ear: Tympanic membrane, ear canal and external ear normal.      Left Ear: Tympanic membrane, ear canal and external ear normal.      Nose: Nose normal.      Mouth/Throat:      Mouth: Mucous membranes are moist.      Pharynx: " Oropharynx is clear.   Eyes:      Extraocular Movements: Extraocular movements intact.      Conjunctiva/sclera: Conjunctivae normal.      Pupils: Pupils are equal, round, and reactive to light.   Cardiovascular:      Rate and Rhythm: Normal rate and regular rhythm.      Pulses: Normal pulses.      Heart sounds: Normal heart sounds.   Pulmonary:      Effort: Pulmonary effort is normal.      Breath sounds: Normal breath sounds.   Abdominal:      General: Bowel sounds are normal.      Palpations: Abdomen is soft.   Musculoskeletal:         General: Tenderness (mild tenderness in left lat back , no movment increases pain, stated when she breaths sometimes it hurts,) present. Normal range of motion.      Cervical back: Normal range of motion and neck supple.   Skin:     General: Skin is warm and dry.      Capillary Refill: Capillary refill takes less than 2 seconds.   Neurological:      General: No focal deficit present.      Mental Status: She is alert and oriented to person, place, and time.   Psychiatric:         Mood and Affect: Mood normal.         Behavior: Behavior normal.        Assessment and Plan (including Health Maintenance)      Problem List  [employer]  Document Outside HM   :    Plan: Take mobic when taking voltaren, meds as ordered, eat 3 well balanced meals per day with in between snacks, return to clinic as needed and as scheduled    Restless leg syndrome            Health Maintenance Due   Topic Date Due    Hepatitis C Screening  Never done    HIV Screening  Never done    TETANUS VACCINE  Never done    Mammogram  Never done    Colorectal Cancer Screening  Never done    Shingles Vaccine (1 of 2) Never done    Influenza Vaccine (1) Never done       Problem List Items Addressed This Visit          Neuro    Restless leg syndrome         Health Maintenance Topics with due status: Not Due       Topic Last Completion Date    Lipid Panel 08/31/2022       Procedures     No future appointments.     No follow-ups  on file.       Signature:  Sheba Grant NP    Date of encounter: 9/28/22

## 2022-09-28 NOTE — PATIENT INSTRUCTIONS
Take mobic when taking voltaren, meds as ordered, eat 3 well balanced meals per day with in between snacks, return to clinic as needed and as scheduled

## 2023-02-22 ENCOUNTER — PATIENT OUTREACH (OUTPATIENT)
Dept: ADMINISTRATIVE | Facility: HOSPITAL | Age: 63
End: 2023-02-22

## 2023-05-23 ENCOUNTER — PATIENT OUTREACH (OUTPATIENT)
Dept: ADMINISTRATIVE | Facility: HOSPITAL | Age: 63
End: 2023-05-23

## 2023-06-07 ENCOUNTER — OFFICE VISIT (OUTPATIENT)
Dept: FAMILY MEDICINE | Facility: CLINIC | Age: 63
End: 2023-06-07
Payer: MEDICARE

## 2023-06-07 VITALS
WEIGHT: 121.81 LBS | SYSTOLIC BLOOD PRESSURE: 136 MMHG | RESPIRATION RATE: 18 BRPM | DIASTOLIC BLOOD PRESSURE: 75 MMHG | BODY MASS INDEX: 18.46 KG/M2 | TEMPERATURE: 98 F | OXYGEN SATURATION: 100 % | HEIGHT: 68 IN | HEART RATE: 83 BPM

## 2023-06-07 DIAGNOSIS — R30.0 DYSURIA: Primary | ICD-10-CM

## 2023-06-07 DIAGNOSIS — M54.12 CERVICAL RADICULOPATHY: ICD-10-CM

## 2023-06-07 DIAGNOSIS — I10 HYPERTENSION, UNSPECIFIED TYPE: ICD-10-CM

## 2023-06-07 DIAGNOSIS — Z13.1 SCREENING FOR DIABETES MELLITUS: ICD-10-CM

## 2023-06-07 DIAGNOSIS — J98.4 RESTRICTIVE AIRWAY DISEASE: ICD-10-CM

## 2023-06-07 DIAGNOSIS — D64.9 ANEMIA, UNSPECIFIED TYPE: ICD-10-CM

## 2023-06-07 DIAGNOSIS — M54.50 ACUTE LEFT-SIDED LOW BACK PAIN WITHOUT SCIATICA: ICD-10-CM

## 2023-06-07 LAB
ALBUMIN SERPL BCP-MCNC: 3.7 G/DL (ref 3.5–5)
ALBUMIN/GLOB SERPL: 0.9 {RATIO}
ALP SERPL-CCNC: 78 U/L (ref 50–130)
ALT SERPL W P-5'-P-CCNC: 23 U/L (ref 13–56)
ANION GAP SERPL CALCULATED.3IONS-SCNC: 8 MMOL/L (ref 7–16)
ANISOCYTOSIS BLD QL SMEAR: ABNORMAL
AST SERPL W P-5'-P-CCNC: 24 U/L (ref 15–37)
BASOPHILS # BLD AUTO: 0.07 K/UL (ref 0–0.2)
BASOPHILS NFR BLD AUTO: 1.5 % (ref 0–1)
BILIRUB SERPL-MCNC: 0.3 MG/DL (ref ?–1.2)
BILIRUB SERPL-MCNC: NEGATIVE MG/DL
BLOOD URINE, POC: NEGATIVE
BUN SERPL-MCNC: 12 MG/DL (ref 7–18)
BUN/CREAT SERPL: 17 (ref 6–20)
CALCIUM SERPL-MCNC: 8.8 MG/DL (ref 8.5–10.1)
CHLORIDE SERPL-SCNC: 109 MMOL/L (ref 98–107)
CHOLEST SERPL-MCNC: 174 MG/DL (ref 0–200)
CHOLEST/HDLC SERPL: 3.2 {RATIO}
CO2 SERPL-SCNC: 24 MMOL/L (ref 21–32)
COLOR, POC UA: YELLOW
CREAT SERPL-MCNC: 0.7 MG/DL (ref 0.55–1.02)
CREAT UR-MCNC: 129 MG/DL (ref 28–219)
DIFFERENTIAL METHOD BLD: ABNORMAL
EGFR (NO RACE VARIABLE) (RUSH/TITUS): 97 ML/MIN/1.73M2
EOSINOPHIL # BLD AUTO: 0.08 K/UL (ref 0–0.5)
EOSINOPHIL NFR BLD AUTO: 1.7 % (ref 1–4)
ERYTHROCYTE [DISTWIDTH] IN BLOOD BY AUTOMATED COUNT: 19.1 % (ref 11.5–14.5)
GLOBULIN SER-MCNC: 3.9 G/DL (ref 2–4)
GLUCOSE SERPL-MCNC: 100 MG/DL (ref 74–106)
GLUCOSE UR QL STRIP: NEGATIVE
HCT VFR BLD AUTO: 26.8 % (ref 38–47)
HDLC SERPL-MCNC: 55 MG/DL (ref 40–60)
HGB BLD-MCNC: 6.8 G/DL (ref 12–16)
HYPOCHROMIA BLD QL SMEAR: ABNORMAL
IMM GRANULOCYTES # BLD AUTO: 0.02 K/UL (ref 0–0.04)
IMM GRANULOCYTES NFR BLD: 0.4 % (ref 0–0.4)
KETONES UR QL STRIP: NEGATIVE
LDLC SERPL CALC-MCNC: 93 MG/DL
LDLC/HDLC SERPL: 1.7 {RATIO}
LEUKOCYTE ESTERASE URINE, POC: ABNORMAL
LYMPHOCYTES # BLD AUTO: 1.22 K/UL (ref 1–4.8)
LYMPHOCYTES NFR BLD AUTO: 25.4 % (ref 27–41)
MCH RBC QN AUTO: 16.4 PG (ref 27–31)
MCHC RBC AUTO-ENTMCNC: 25.4 G/DL (ref 32–36)
MCV RBC AUTO: 64.6 FL (ref 80–96)
MICROALBUMIN UR-MCNC: 2 MG/DL (ref 0–2.8)
MICROALBUMIN/CREAT RATIO PNL UR: 15.5 MG/G (ref 0–30)
MICROCYTES BLD QL SMEAR: ABNORMAL
MONOCYTES # BLD AUTO: 0.39 K/UL (ref 0–0.8)
MONOCYTES NFR BLD AUTO: 8.1 % (ref 2–6)
MPC BLD CALC-MCNC: 11.2 FL (ref 9.4–12.4)
NEUTROPHILS # BLD AUTO: 3.03 K/UL (ref 1.8–7.7)
NEUTROPHILS NFR BLD AUTO: 62.9 % (ref 53–65)
NITRITE, POC UA: POSITIVE
NONHDLC SERPL-MCNC: 119 MG/DL
NRBC # BLD AUTO: 0 X10E3/UL
NRBC, AUTO (.00): 0 %
OVALOCYTES BLD QL SMEAR: ABNORMAL
PH, POC UA: 7
PLATELET # BLD AUTO: 411 K/UL (ref 150–400)
PLATELET MORPHOLOGY: ABNORMAL
POLYCHROMASIA BLD QL SMEAR: ABNORMAL
POTASSIUM SERPL-SCNC: 3.9 MMOL/L (ref 3.5–5.1)
PROT SERPL-MCNC: 7.6 G/DL (ref 6.4–8.2)
PROTEIN, POC: NEGATIVE
RBC # BLD AUTO: 4.15 M/UL (ref 4.2–5.4)
SCHISTOCYTES BLD QL AUTO: ABNORMAL
SODIUM SERPL-SCNC: 137 MMOL/L (ref 136–145)
SPECIFIC GRAVITY, POC UA: 1.02
TARGETS BLD QL SMEAR: ABNORMAL
TRIGL SERPL-MCNC: 128 MG/DL (ref 35–150)
UROBILINOGEN, POC UA: 0.2
VLDLC SERPL-MCNC: 26 MG/DL
WBC # BLD AUTO: 4.81 K/UL (ref 4.5–11)

## 2023-06-07 PROCEDURE — 80053 COMPREHEN METABOLIC PANEL: CPT | Mod: ,,, | Performed by: CLINICAL MEDICAL LABORATORY

## 2023-06-07 PROCEDURE — 3078F PR MOST RECENT DIASTOLIC BLOOD PRESSURE < 80 MM HG: ICD-10-PCS | Mod: ,,, | Performed by: NURSE PRACTITIONER

## 2023-06-07 PROCEDURE — 3078F DIAST BP <80 MM HG: CPT | Mod: ,,, | Performed by: NURSE PRACTITIONER

## 2023-06-07 PROCEDURE — 81003 POCT URINALYSIS W/O SCOPE: ICD-10-PCS | Mod: QW,,, | Performed by: NURSE PRACTITIONER

## 2023-06-07 PROCEDURE — 85025 CBC WITH DIFFERENTIAL: ICD-10-PCS | Mod: ,,, | Performed by: CLINICAL MEDICAL LABORATORY

## 2023-06-07 PROCEDURE — 82043 MICROALBUMIN / CREATININE RATIO URINE: ICD-10-PCS | Mod: ,,, | Performed by: CLINICAL MEDICAL LABORATORY

## 2023-06-07 PROCEDURE — 3008F BODY MASS INDEX DOCD: CPT | Mod: ,,, | Performed by: NURSE PRACTITIONER

## 2023-06-07 PROCEDURE — 80061 LIPID PANEL: CPT | Mod: ,,, | Performed by: CLINICAL MEDICAL LABORATORY

## 2023-06-07 PROCEDURE — 99214 OFFICE O/P EST MOD 30 MIN: CPT | Mod: 25,,, | Performed by: NURSE PRACTITIONER

## 2023-06-07 PROCEDURE — 1159F MED LIST DOCD IN RCRD: CPT | Mod: ,,, | Performed by: NURSE PRACTITIONER

## 2023-06-07 PROCEDURE — 81003 URINALYSIS AUTO W/O SCOPE: CPT | Mod: QW,,, | Performed by: NURSE PRACTITIONER

## 2023-06-07 PROCEDURE — 82570 MICROALBUMIN / CREATININE RATIO URINE: ICD-10-PCS | Mod: ,,, | Performed by: CLINICAL MEDICAL LABORATORY

## 2023-06-07 PROCEDURE — 99214 PR OFFICE/OUTPT VISIT, EST, LEVL IV, 30-39 MIN: ICD-10-PCS | Mod: 25,,, | Performed by: NURSE PRACTITIONER

## 2023-06-07 PROCEDURE — 3075F SYST BP GE 130 - 139MM HG: CPT | Mod: ,,, | Performed by: NURSE PRACTITIONER

## 2023-06-07 PROCEDURE — 96372 PR INJECTION,THERAP/PROPH/DIAG2ST, IM OR SUBCUT: ICD-10-PCS | Mod: ,,, | Performed by: NURSE PRACTITIONER

## 2023-06-07 PROCEDURE — 82570 ASSAY OF URINE CREATININE: CPT | Mod: ,,, | Performed by: CLINICAL MEDICAL LABORATORY

## 2023-06-07 PROCEDURE — 3008F PR BODY MASS INDEX (BMI) DOCUMENTED: ICD-10-PCS | Mod: ,,, | Performed by: NURSE PRACTITIONER

## 2023-06-07 PROCEDURE — 80061 LIPID PANEL: ICD-10-PCS | Mod: ,,, | Performed by: CLINICAL MEDICAL LABORATORY

## 2023-06-07 PROCEDURE — 85025 COMPLETE CBC W/AUTO DIFF WBC: CPT | Mod: ,,, | Performed by: CLINICAL MEDICAL LABORATORY

## 2023-06-07 PROCEDURE — 80053 COMPREHENSIVE METABOLIC PANEL: ICD-10-PCS | Mod: ,,, | Performed by: CLINICAL MEDICAL LABORATORY

## 2023-06-07 PROCEDURE — 82043 UR ALBUMIN QUANTITATIVE: CPT | Mod: ,,, | Performed by: CLINICAL MEDICAL LABORATORY

## 2023-06-07 PROCEDURE — 3075F PR MOST RECENT SYSTOLIC BLOOD PRESS GE 130-139MM HG: ICD-10-PCS | Mod: ,,, | Performed by: NURSE PRACTITIONER

## 2023-06-07 PROCEDURE — 1159F PR MEDICATION LIST DOCUMENTED IN MEDICAL RECORD: ICD-10-PCS | Mod: ,,, | Performed by: NURSE PRACTITIONER

## 2023-06-07 PROCEDURE — 96372 THER/PROPH/DIAG INJ SC/IM: CPT | Mod: ,,, | Performed by: NURSE PRACTITIONER

## 2023-06-07 RX ORDER — NITROFURANTOIN 25; 75 MG/1; MG/1
100 CAPSULE ORAL 2 TIMES DAILY
Qty: 20 CAPSULE | Refills: 0 | Status: SHIPPED | OUTPATIENT
Start: 2023-06-07 | End: 2023-06-20

## 2023-06-07 RX ORDER — KETOROLAC TROMETHAMINE 30 MG/ML
60 INJECTION, SOLUTION INTRAMUSCULAR; INTRAVENOUS
Status: COMPLETED | OUTPATIENT
Start: 2023-06-07 | End: 2023-06-07

## 2023-06-07 RX ORDER — DEXAMETHASONE SODIUM PHOSPHATE 1 MG/ML
SOLUTION/ DROPS OPHTHALMIC
COMMUNITY
Start: 2023-01-17 | End: 2023-06-20

## 2023-06-07 RX ADMIN — KETOROLAC TROMETHAMINE 60 MG: 30 INJECTION, SOLUTION INTRAMUSCULAR; INTRAVENOUS at 03:06

## 2023-06-07 NOTE — Clinical Note
Hepatitis C Screening Decline COVID-19 Vaccine(1) Decline HIV Screening Decline TETANUS VACCINE Decline Mammogram Decline Shingles Vaccine(1 of 2) Decline

## 2023-06-07 NOTE — ASSESSMENT & PLAN NOTE
Clean perineal area from the front to back after using the bathroom, clean thoroughly before and after having sex, increase water intake to 64 oz per day, avoid sodas, meds as ordered, return to clinic as scheduled

## 2023-06-07 NOTE — PROGRESS NOTES
Sheba Grant NP   [facility]  [address] [address] [place]     PATIENT NAME: Arlette Cisneros  : 1960  DATE: 23  MRN: [id number]      Billing Provider: Sheba Grant NP  Level of Service: [place] OFFICE/OUTPT VISIT, APOLLO LEVMARY GRACE IV, 30-39 MIN  Patient PCP Information       Provider PCP Type    Sheba Grant NP General            Reason for Visit / Chief Complaint: Health Maintenance (Hepatitis C Screening Decline/COVID-19 Vaccine(1) Decline/HIV Screening Decline/TETANUS VACCINE Decline/Mammogram Decline/Shingles Vaccine(1 of 2) Decline), Hypertension, Hyperlipidemia, and Anemia (Follow up, also C/O low back pain for last 2 weeks and burning during urination.)       Update PCP  Update Chief Complaint         History of Present Illness / Problem Focused Workflow     Arlette Cisneros presents to the clinic pt is also wanting a motorized scooter, staed she is giving out so ealisy that she cant compete task in home and cant go to [employer] with giving out, can only walk very distance, c/o low back pain that is going on and off still, also burning during urination, eval of htn, and hyperlipidemia      Review of Systems     Review of Systems   Constitutional:  Negative for chills, fatigue and fever.   HENT:  Negative for nasal congestion, ear pain, facial swelling, hearing loss, mouth dryness, mouth sores, postnasal drip, rhinorrhea, sinus pressure/congestion and goiter.    Eyes:  Negative for discharge and itching.   Respiratory:  Positive for shortness of breath. Negative for cough and wheezing.         Exerting easily   Cardiovascular:  Negative for chest pain and leg swelling.   Gastrointestinal:  Negative for abdominal pain, change in bowel habit and change in bowel habit.   Genitourinary:  Negative for difficulty urinating, dysuria, enuresis, frequency, hematuria and urgency.   Neurological:  Negative for dizziness, vertigo, syncope, weakness and headaches.   Psychiatric/Behavioral:  Negative for  decreased concentration.    All other systems reviewed and are negative.     Medical / Social / Family History     Past Medical History:   Diagnosis Date    Anemia     Cervical radiculopathy 12/23/2018    Degenerative joint disease involving multiple joints 12/21/2020    Hypercholesteremia 03/23/2021    Hypertension     Restrictive lung disease 03/23/2021    Temporomandibular joint disorder 04/30/2018       Past Surgical History:   Procedure Laterality Date    APPENDECTOMY      HYSTERECTOMY         Social History  Ms.  reports that she has never smoked. She has never used smokeless tobacco. She reports that she does not currently use drugs. She reports that she does not drink alcohol.    Family History  Ms.'s family history includes Cancer in her father; Heart disease in her mother; Kidney disease in her father; Stroke in her mother.    Medications and Allergies     Medications  Outpatient Medications Marked as Taking for the 6/7/23 encounter (Office Visit) with Sheba Grant NP   Medication Sig Dispense Refill    dexAMETHasone (DECADRON) 0.1 % ophthalmic solution instill ONE drop IN EACH EYE FOUR TIMES DAILY AS DIRECTED      fluticasone-umeclidin-vilanter (TRELEGY ELLIPTA) 100-62.5-25 mcg DsDv Inhale 1 puff into the lungs once daily.      losartan (COZAAR) 50 MG tablet Take 50 mg by mouth once daily.      meloxicam (MOBIC) 7.5 MG tablet Take 7.5 mg by mouth daily as needed for Pain.      methocarbamoL (ROBAXIN) 500 MG Tab 1 tablet      naproxen (NAPROSYN) 500 MG tablet 1 tablet with food or milk as needed      omeprazole (PRILOSEC) 20 MG capsule TAKE ONE CAPSULE BY MOUTH TWICE DAILY FOR TWO WEEKS THEN MAY decrease TO ONCE DAILY. 90 capsule 1    pramipexole (MIRAPEX) 0.125 MG tablet 1 po hs x 1 week, then may increase to 2 po hs if needed. 180 tablet 1    pravastatin (PRAVACHOL) 20 MG tablet TAKE ONE TABLET BY MOUTH ONCE DAILY 90 tablet 1     Current Facility-Administered Medications for the 6/7/23 encounter  "(Office Visit) with Sheba Grant NP   Medication Dose Route Frequency Provider Last Rate Last Admin    [COMPLETED] ketorolac injection 60 mg  60 mg Intramuscular 1 time in Clinic/HOD Sheba Grant NP   60 mg at 06/07/23 1501       Allergies  Review of patient's allergies indicates:   Allergen Reactions    Codeine sulfate Shortness Of Breath       Physical Examination     Vitals:    06/07/23 1410   BP: 136/75   BP Location: Left arm   Patient Position: Sitting   Pulse: 83   Resp: 18   Temp: 97.9 °F (36.6 °C)   TempSrc: Oral   SpO2: 100%   Weight: 55.2 kg (121 lb 12.8 oz)   Height: 5' 8" (1.727 m)      Physical Exam  Constitutional:       Appearance: Normal appearance.   HENT:      Head: Normocephalic.      Right Ear: Tympanic membrane, ear canal and external ear normal.      Left Ear: Tympanic membrane, ear canal and external ear normal.      Nose: Nose normal.      Mouth/Throat:      Mouth: Mucous membranes are moist.      Pharynx: Oropharynx is clear.   Eyes:      Extraocular Movements: Extraocular movements intact.      Conjunctiva/sclera: Conjunctivae normal.      Pupils: Pupils are equal, round, and reactive to light.   Cardiovascular:      Rate and Rhythm: Normal rate and regular rhythm.      Pulses: Normal pulses.      Heart sounds: Normal heart sounds.   Pulmonary:      Effort: Pulmonary effort is normal.      Breath sounds: Normal breath sounds.      Comments: Exerts easily, was tired when she walked to exam rom, o2 sats were 100%  Abdominal:      General: Bowel sounds are normal.      Palpations: Abdomen is soft.   Musculoskeletal:         General: Tenderness (mild tenderness left lower-mid back, pain or palpation does not increase pain) present. Normal range of motion.   Skin:     General: Skin is warm and dry.      Capillary Refill: Capillary refill takes less than 2 seconds.   Neurological:      General: No focal deficit present.      Mental Status: She is alert and oriented to person, place, and " time.   Psychiatric:         Mood and Affect: Mood normal.         Behavior: Behavior normal.        Assessment and Plan (including Health Maintenance)      Problem List  Smart Sets  Document Outside HM   :    Plan:     Dysuria  -     POCT URINALYSIS W/O SCOPE    Hypertension, unspecified type  -     Comprehensive Metabolic Panel; Future; Expected date: 06/07/2023  -     Lipid Panel; Future; Expected date: 06/07/2023  -     Microalbumin/Creatinine Ratio, Urine; Future; Expected date: 06/07/2023    Anemia, unspecified type  -     CBC Auto Differential; Future; Expected date: 06/07/2023    Screening for diabetes mellitus    Acute left-sided low back pain without sciatica  -     ketorolac injection 60 mg    Restrictive airway disease  -     Cancel: MOTORIZED SCOOTER FOR HOME USE  -     MOTORIZED SCOOTER FOR HOME USE  -     Cancel: Ambulatory referral/consult to Pulmonology; Future; Expected date: 06/14/2023  -     Ambulatory referral/consult to Pulmonology; Future; Expected date: 06/07/2023    Other orders  -     nitrofurantoin, macrocrystal-monohydrate, (MACROBID) 100 MG capsule; Take 1 capsule (100 mg total) by mouth 2 (two) times daily.  Dispense: 20 capsule; Refill: 0            Health Maintenance Due   Topic Date Due    Hepatitis C Screening  Never done    COVID-19 Vaccine (1) Never done    HIV Screening  Never done    TETANUS VACCINE  Never done    Mammogram  Never done    Shingles Vaccine (1 of 2) Never done       Problem List Items Addressed This Visit          Pulmonary    Restrictive airway disease    Current Assessment & Plan     Avoid irritants, meds asordered, eval per pulmonologist         Relevant Orders    MOTORIZED SCOOTER FOR HOME USE    Ambulatory referral/consult to Pulmonology       Cardiac/Vascular    Hypertension    Current Assessment & Plan     The current medical regimen is effective;  continue present plan and medications.           Relevant Orders    Comprehensive Metabolic Panel    Lipid  Panel    Microalbumin/Creatinine Ratio, Urine       Renal/    Dysuria - Primary    Current Assessment & Plan     Clean perineal area from the front to back after using the bathroom, clean thoroughly before and after having sex, increase water intake to 64 oz per day, avoid sodas, meds as ordered, return to clinic as scheduled           Relevant Orders    POCT URINALYSIS W/O SCOPE (Completed)       Oncology    Anemia    Current Assessment & Plan     Check cbc, monitor for dark stools or coffee ground emesis, return to clinic as needed and as scheduled         Relevant Orders    CBC Auto Differential       Orthopedic    Acute left-sided low back pain without sciatica    Current Assessment & Plan     meds as ordered, return to clinci as needed, avoid lifting more than 5 jpunds for 2 weeks         Relevant Medications    ketorolac injection 60 mg (Completed)     Other Visit Diagnoses       Screening for diabetes mellitus                  Health Maintenance Topics with due status: Not Due       Topic Last Completion Date    Colorectal Cancer Screening 09/09/2020    Lipid Panel 08/31/2022    Influenza Vaccine Not Due       Procedures     No future appointments.     Follow up in about 2 weeks (around 6/21/2023).       Signature:  Sheba Grant NP    Date of encounter: 6/7/23

## 2023-06-07 NOTE — ASSESSMENT & PLAN NOTE
Check cbc, monitor for dark stools or coffee ground emesis, return to clinic as needed and as scheduled

## 2023-06-09 DIAGNOSIS — Z71.89 COMPLEX CARE COORDINATION: ICD-10-CM

## 2023-06-13 ENCOUNTER — LAB VISIT (OUTPATIENT)
Dept: LAB | Facility: HOSPITAL | Age: 63
End: 2023-06-13
Attending: NURSE PRACTITIONER
Payer: MEDICARE

## 2023-06-13 DIAGNOSIS — D64.9 ANEMIA, UNSPECIFIED TYPE: ICD-10-CM

## 2023-06-13 DIAGNOSIS — D64.9 ANEMIA, UNSPECIFIED TYPE: Primary | ICD-10-CM

## 2023-06-13 LAB
GROUP & RH: NORMAL
INDIRECT COOMBS GEL: NEGATIVE
SPECIMEN OUTDATE: NORMAL

## 2023-06-13 PROCEDURE — 86850 RBC ANTIBODY SCREEN: CPT | Performed by: NURSE PRACTITIONER

## 2023-06-13 PROCEDURE — 86901 BLOOD TYPING SEROLOGIC RH(D): CPT | Performed by: NURSE PRACTITIONER

## 2023-06-13 PROCEDURE — 36415 COLL VENOUS BLD VENIPUNCTURE: CPT

## 2023-06-13 PROCEDURE — 86900 BLOOD TYPING SEROLOGIC ABO: CPT | Performed by: NURSE PRACTITIONER

## 2023-06-13 RX ORDER — SODIUM CHLORIDE 0.9 % (FLUSH) 0.9 %
10 SYRINGE (ML) INJECTION
Status: CANCELLED | OUTPATIENT
Start: 2023-06-13

## 2023-06-13 RX ORDER — ACETAMINOPHEN 325 MG/1
650 TABLET ORAL
Status: CANCELLED | OUTPATIENT
Start: 2023-06-13

## 2023-06-13 RX ORDER — HYDROCODONE BITARTRATE AND ACETAMINOPHEN 500; 5 MG/1; MG/1
TABLET ORAL ONCE
Status: CANCELLED | OUTPATIENT
Start: 2023-06-13 | End: 2023-06-13

## 2023-06-13 RX ORDER — DIPHENHYDRAMINE HCL 25 MG
25 CAPSULE ORAL
Status: CANCELLED | OUTPATIENT
Start: 2023-06-13

## 2023-06-13 NOTE — PROGRESS NOTES
Check on pt, see if she has had any dark stools or vomiting blood, any sob, fatigue, needs 2 units Packed RBCs due to low h/h

## 2023-06-13 NOTE — PROGRESS NOTES
Notified LAKESHA Main Infusion RN at [facility], of orders. States will consult lab re: type and screen and notify patient of transfusion date/ time.

## 2023-06-14 ENCOUNTER — INFUSION (OUTPATIENT)
Dept: INFUSION THERAPY | Facility: HOSPITAL | Age: 63
End: 2023-06-14
Attending: NURSE PRACTITIONER
Payer: MEDICARE

## 2023-06-14 VITALS
DIASTOLIC BLOOD PRESSURE: 78 MMHG | RESPIRATION RATE: 16 BRPM | TEMPERATURE: 98 F | HEART RATE: 64 BPM | SYSTOLIC BLOOD PRESSURE: 158 MMHG | OXYGEN SATURATION: 99 %

## 2023-06-14 DIAGNOSIS — D64.9 ANEMIA, UNSPECIFIED TYPE: ICD-10-CM

## 2023-06-14 PROCEDURE — P9016 RBC LEUKOCYTES REDUCED: HCPCS | Performed by: NURSE PRACTITIONER

## 2023-06-14 PROCEDURE — 25000003 PHARM REV CODE 250: Performed by: NURSE PRACTITIONER

## 2023-06-14 PROCEDURE — 86920 COMPATIBILITY TEST SPIN: CPT | Mod: 59 | Performed by: NURSE PRACTITIONER

## 2023-06-14 PROCEDURE — 36430 TRANSFUSION BLD/BLD COMPNT: CPT

## 2023-06-14 RX ORDER — ACETAMINOPHEN 325 MG/1
650 TABLET ORAL
Status: DISCONTINUED | OUTPATIENT
Start: 2023-06-14 | End: 2023-06-20 | Stop reason: ALTCHOICE

## 2023-06-14 RX ORDER — DIPHENHYDRAMINE HCL 25 MG
25 CAPSULE ORAL
Status: DISCONTINUED | OUTPATIENT
Start: 2023-06-14 | End: 2023-06-20 | Stop reason: ALTCHOICE

## 2023-06-14 RX ORDER — HYDROCODONE BITARTRATE AND ACETAMINOPHEN 500; 5 MG/1; MG/1
TABLET ORAL ONCE
Status: COMPLETED | OUTPATIENT
Start: 2023-06-14 | End: 2023-06-14

## 2023-06-14 RX ORDER — SODIUM CHLORIDE 0.9 % (FLUSH) 0.9 %
10 SYRINGE (ML) INJECTION
Status: DISCONTINUED | OUTPATIENT
Start: 2023-06-14 | End: 2023-06-20 | Stop reason: ALTCHOICE

## 2023-06-14 RX ADMIN — SODIUM CHLORIDE: 9 INJECTION, SOLUTION INTRAVENOUS at 11:06

## 2023-06-14 NOTE — PROGRESS NOTES
1050 Pt in room 307    1100 Vital Signs obtained.  Blood consent form explained.  S/S of reaction explained. Blood consent signed.      1117 INT started to right inner forearm.  Tolerated well.     1145 Blood transfusion started.      1200 Tolerating Blood transfusion.  No signs of reaction noted.  Eating Meal at present.  HOB elevated.     1230 Increased IV PRBC rate 125ml/hr    1300 Increased IV PRBC rate 150ml/hr    1400 Watching TV with family.  No problems noted.     1500 1st unit complete.  No problems noted.     1505 2nd unit of PRBCs started.  Family at bedside.     1515 Talking on phone.  No problems noted.      1600 Tolerating infusion.  No problems noted    1700 No change Family at bedside.    1830 INT D/C    1840 D/C home, ambulatory with family.

## 2023-06-16 ENCOUNTER — TELEPHONE (OUTPATIENT)
Dept: FAMILY MEDICINE | Facility: CLINIC | Age: 63
End: 2023-06-16
Payer: MEDICARE

## 2023-06-16 NOTE — TELEPHONE ENCOUNTER
----- Message from Mariah Meadows sent at 6/16/2023  9:19 AM CDT -----  Pt called to ask if the you could change the RX for her kidney because it make her sick to her stomach. 06/16 9:21am    0930: Spoke w/ patient. Has 1 more day on Macrobid. Instructed to eat w/ medication and complete RX. Advised to call us back if she continued to have GI upset.   Voiced understanding.

## 2023-06-18 LAB — RBCS: NORMAL

## 2023-06-19 ENCOUNTER — PATIENT OUTREACH (OUTPATIENT)
Dept: ADMINISTRATIVE | Facility: HOSPITAL | Age: 63
End: 2023-06-19

## 2023-06-19 NOTE — LETTER
June 19, 2023     Arlette Cisneros  [address] [facility] [place] [address]         Dear Ms. Blayne:      Your [facility] (Sheba Molina NP) primary care team is dedicated to assisting you achieve your health goals.  Scheduling your routine screenings and tests are key to your good health.  Our records indicate you may be overdue for your screening mammogram.  Mammography screening can help find breast cancer at an early stage, when it is most likely to be successfully treated.    We encourage you to schedule your appointment at any of our [facility] imaging locations.     If you recently had your mammogram outside of [facility], please notify your primary care team so we can update your health record.    If you have questions or want to schedule your screening mammogram, please contact your primary care clinic.                                      [facility] [address] [place] [address]                                               Tel: [phone]      Sincerely,    Patrica Eaton  Your [facility] Primary Care Team

## 2023-06-19 NOTE — PROGRESS NOTES
Health maintenance record review for population health care gaps    06/19/2023   --Chart accessed for: All Topics  --Care Gaps addressed: Mammogram  Outreach made to patient via Patient portal and mail . (Success) (Left Message) (Unavailable)   Patient stated   Care Everywhere updates requested and reviewed.  Media reports reviewed.  [facility] and [facility] reviewed.  Immunization Database (Immprint/MIXX) reviewed. Vaccinations uploaded:   updated with external HAC  Report  Requested  records from   Next appointment  . Appointment notes updated to include:   Health Maintenance Due   Topic Date Due    Hepatitis C Screening  Never done    COVID-19 Vaccine (1) Never done    HIV Screening  Never done    TETANUS VACCINE  Never done    Mammogram  Never done    Shingles Vaccine (1 of 2) Never done    Colorectal Cancer Screening  09/09/2023

## 2023-06-20 ENCOUNTER — OFFICE VISIT (OUTPATIENT)
Dept: FAMILY MEDICINE | Facility: CLINIC | Age: 63
End: 2023-06-20
Payer: MEDICARE

## 2023-06-20 VITALS
DIASTOLIC BLOOD PRESSURE: 89 MMHG | WEIGHT: 122 LBS | HEIGHT: 68 IN | OXYGEN SATURATION: 96 % | TEMPERATURE: 98 F | BODY MASS INDEX: 18.49 KG/M2 | HEART RATE: 78 BPM | RESPIRATION RATE: 18 BRPM | SYSTOLIC BLOOD PRESSURE: 159 MMHG

## 2023-06-20 DIAGNOSIS — I10 ESSENTIAL HYPERTENSION: Primary | ICD-10-CM

## 2023-06-20 DIAGNOSIS — G60.3 IDIOPATHIC PROGRESSIVE NEUROPATHY: ICD-10-CM

## 2023-06-20 DIAGNOSIS — I10 HYPERTENSION, UNSPECIFIED TYPE: ICD-10-CM

## 2023-06-20 DIAGNOSIS — D64.9 ANEMIA, UNSPECIFIED TYPE: ICD-10-CM

## 2023-06-20 LAB
ANISOCYTOSIS BLD QL SMEAR: ABNORMAL
BASOPHILS # BLD AUTO: 0.07 K/UL (ref 0–0.2)
BASOPHILS NFR BLD AUTO: 1.6 % (ref 0–1)
DACRYOCYTES BLD QL SMEAR: ABNORMAL
DIFFERENTIAL METHOD BLD: ABNORMAL
EOSINOPHIL # BLD AUTO: 0.09 K/UL (ref 0–0.5)
EOSINOPHIL NFR BLD AUTO: 2 % (ref 1–4)
ERYTHROCYTE [DISTWIDTH] IN BLOOD BY AUTOMATED COUNT: 27.8 % (ref 11.5–14.5)
HCT VFR BLD AUTO: 40.1 % (ref 38–47)
HGB BLD-MCNC: 11.2 G/DL (ref 12–16)
HYPOCHROMIA BLD QL SMEAR: ABNORMAL
IMM GRANULOCYTES # BLD AUTO: 0.01 K/UL (ref 0–0.04)
IMM GRANULOCYTES NFR BLD: 0.2 % (ref 0–0.4)
LYMPHOCYTES # BLD AUTO: 1.16 K/UL (ref 1–4.8)
LYMPHOCYTES NFR BLD AUTO: 25.7 % (ref 27–41)
MCH RBC QN AUTO: 20.7 PG (ref 27–31)
MCHC RBC AUTO-ENTMCNC: 27.9 G/DL (ref 32–36)
MCV RBC AUTO: 74.1 FL (ref 80–96)
MICROCYTES BLD QL SMEAR: ABNORMAL
MONOCYTES # BLD AUTO: 0.47 K/UL (ref 0–0.8)
MONOCYTES NFR BLD AUTO: 10.4 % (ref 2–6)
MPC BLD CALC-MCNC: ABNORMAL G/DL
NEUTROPHILS # BLD AUTO: 2.71 K/UL (ref 1.8–7.7)
NEUTROPHILS NFR BLD AUTO: 60.1 % (ref 53–65)
NRBC # BLD AUTO: 0 X10E3/UL
NRBC, AUTO (.00): 0 %
OVALOCYTES BLD QL SMEAR: ABNORMAL
PLATELET # BLD AUTO: 332 K/UL (ref 150–400)
PLATELET MORPHOLOGY: ABNORMAL
RBC # BLD AUTO: 5.41 M/UL (ref 4.2–5.4)
SCHISTOCYTES BLD QL AUTO: ABNORMAL
WBC # BLD AUTO: 4.51 K/UL (ref 4.5–11)

## 2023-06-20 PROCEDURE — 82607 VITAMIN B-12: CPT | Mod: ,,, | Performed by: CLINICAL MEDICAL LABORATORY

## 2023-06-20 PROCEDURE — 4010F PR ACE/ARB THEARPY RXD/TAKEN: ICD-10-PCS | Mod: ,,, | Performed by: NURSE PRACTITIONER

## 2023-06-20 PROCEDURE — 82746 ASSAY OF FOLIC ACID SERUM: CPT | Mod: ,,, | Performed by: CLINICAL MEDICAL LABORATORY

## 2023-06-20 PROCEDURE — 83540 IRON AND TIBC: ICD-10-PCS | Mod: ,,, | Performed by: CLINICAL MEDICAL LABORATORY

## 2023-06-20 PROCEDURE — 82607 VITAMIN B12/FOLATE, SERUM PANEL: ICD-10-PCS | Mod: ,,, | Performed by: CLINICAL MEDICAL LABORATORY

## 2023-06-20 PROCEDURE — 1159F MED LIST DOCD IN RCRD: CPT | Mod: ,,, | Performed by: NURSE PRACTITIONER

## 2023-06-20 PROCEDURE — 83550 IRON BINDING TEST: CPT | Mod: ,,, | Performed by: CLINICAL MEDICAL LABORATORY

## 2023-06-20 PROCEDURE — 3061F NEG MICROALBUMINURIA REV: CPT | Mod: ,,, | Performed by: NURSE PRACTITIONER

## 2023-06-20 PROCEDURE — 3066F NEPHROPATHY DOC TX: CPT | Mod: ,,, | Performed by: NURSE PRACTITIONER

## 2023-06-20 PROCEDURE — 3077F PR MOST RECENT SYSTOLIC BLOOD PRESSURE >= 140 MM HG: ICD-10-PCS | Mod: ,,, | Performed by: NURSE PRACTITIONER

## 2023-06-20 PROCEDURE — 99214 PR OFFICE/OUTPT VISIT, EST, LEVL IV, 30-39 MIN: ICD-10-PCS | Mod: ,,, | Performed by: NURSE PRACTITIONER

## 2023-06-20 PROCEDURE — 99214 OFFICE O/P EST MOD 30 MIN: CPT | Mod: ,,, | Performed by: NURSE PRACTITIONER

## 2023-06-20 PROCEDURE — 83550 IRON AND TIBC: ICD-10-PCS | Mod: ,,, | Performed by: CLINICAL MEDICAL LABORATORY

## 2023-06-20 PROCEDURE — 3008F BODY MASS INDEX DOCD: CPT | Mod: ,,, | Performed by: NURSE PRACTITIONER

## 2023-06-20 PROCEDURE — 3079F DIAST BP 80-89 MM HG: CPT | Mod: ,,, | Performed by: NURSE PRACTITIONER

## 2023-06-20 PROCEDURE — 3008F PR BODY MASS INDEX (BMI) DOCUMENTED: ICD-10-PCS | Mod: ,,, | Performed by: NURSE PRACTITIONER

## 2023-06-20 PROCEDURE — 1159F PR MEDICATION LIST DOCUMENTED IN MEDICAL RECORD: ICD-10-PCS | Mod: ,,, | Performed by: NURSE PRACTITIONER

## 2023-06-20 PROCEDURE — 3077F SYST BP >= 140 MM HG: CPT | Mod: ,,, | Performed by: NURSE PRACTITIONER

## 2023-06-20 PROCEDURE — 3061F PR NEG MICROALBUMINURIA RESULT DOCUMENTED/REVIEW: ICD-10-PCS | Mod: ,,, | Performed by: NURSE PRACTITIONER

## 2023-06-20 PROCEDURE — 1160F RVW MEDS BY RX/DR IN RCRD: CPT | Mod: ,,, | Performed by: NURSE PRACTITIONER

## 2023-06-20 PROCEDURE — 83921 ORGANIC ACID SINGLE QUANT: CPT | Mod: 90,,, | Performed by: CLINICAL MEDICAL LABORATORY

## 2023-06-20 PROCEDURE — 3079F PR MOST RECENT DIASTOLIC BLOOD PRESSURE 80-89 MM HG: ICD-10-PCS | Mod: ,,, | Performed by: NURSE PRACTITIONER

## 2023-06-20 PROCEDURE — 82746 VITAMIN B12/FOLATE, SERUM PANEL: ICD-10-PCS | Mod: ,,, | Performed by: CLINICAL MEDICAL LABORATORY

## 2023-06-20 PROCEDURE — 83921 METHYLMALONIC ACID, SERUM: ICD-10-PCS | Mod: 90,,, | Performed by: CLINICAL MEDICAL LABORATORY

## 2023-06-20 PROCEDURE — 83540 ASSAY OF IRON: CPT | Mod: ,,, | Performed by: CLINICAL MEDICAL LABORATORY

## 2023-06-20 PROCEDURE — 85025 CBC WITH DIFFERENTIAL: ICD-10-PCS | Mod: ,,, | Performed by: CLINICAL MEDICAL LABORATORY

## 2023-06-20 PROCEDURE — 85025 COMPLETE CBC W/AUTO DIFF WBC: CPT | Mod: ,,, | Performed by: CLINICAL MEDICAL LABORATORY

## 2023-06-20 PROCEDURE — 3066F PR DOCUMENTATION OF TREATMENT FOR NEPHROPATHY: ICD-10-PCS | Mod: ,,, | Performed by: NURSE PRACTITIONER

## 2023-06-20 PROCEDURE — 1160F PR REVIEW ALL MEDS BY PRESCRIBER/CLIN PHARMACIST DOCUMENTED: ICD-10-PCS | Mod: ,,, | Performed by: NURSE PRACTITIONER

## 2023-06-20 PROCEDURE — 4010F ACE/ARB THERAPY RXD/TAKEN: CPT | Mod: ,,, | Performed by: NURSE PRACTITIONER

## 2023-06-20 RX ORDER — LOSARTAN POTASSIUM 50 MG/1
50 TABLET ORAL DAILY
Qty: 30 TABLET | Refills: 0 | Status: SHIPPED | OUTPATIENT
Start: 2023-06-20 | End: 2023-07-25 | Stop reason: SDUPTHER

## 2023-06-20 NOTE — PROGRESS NOTES
LAYNE De La Torre   [facility]  [address]  [place] [address]     PATIENT NAME: Arlette Cisneros  : 1960  DATE: 23  MRN: [id number]      Billing Provider: LAYNE De La Torre  Level of Service: IA OFFICE/OUTPT VISIT, EST, LEVL IV, 30-39 MIN  Patient PCP Information       Provider PCP Type    Sheba Grant NP General            Reason for Visit / Chief Complaint: Follow-up (2 week follow up r/t dysuria and anemia, blood transfusion on 2023, patient states she feels a lot better but just concerned why her levels drop like they do) and Hypertension (Patient was told to stop taking Losartan a while back because her BP was much better but since she has been anemic her BP has been elevated)   Health Maintenance Due   Topic Date Due    Hepatitis C Screening  Never done    COVID-19 Vaccine (1) Never done    HIV Screening  Never done    TETANUS VACCINE  Never done    Mammogram  Never done    Shingles Vaccine (1 of 2) Never done    Colorectal Cancer Screening  2023          Update PCP  Update Chief Complaint         History of Present Illness / Problem Focused Workflow     Arlette Cisneros presents to the clinic  for 2 week follow up. She had low rbc received 2 units of blood  and feels better. She reports she  felt very weak and felt like she was going to pass out. She had blood check and H&H 6 and 28. She is on omeprazole daily for GERD, if she does not take she has severe reflux symptoms. Her last scope without bleeding and follow up is due 2023.  She reports she has had chronic anemia for some time.She reports no appetite and early satiety. She has COPD that already causes her shortness of breath, she denies any black tarry stools,no hematemesis,  no NSAID use, and eats a healthy diet. She reports neuropathy,and  stinging to lower legs for years(this did improve with transfusion).     Hemoglobin A1C   Date Value Ref Range Status   2022 5.9 4.5 -  6.6 % Final     Comment:       Normal:               <5.7%  Pre-Diabetic:       5.7% to 6.4%  Diabetic:             >6.4%  Diabetic Goal:     <7%        CMP  Sodium   Date Value Ref Range Status   06/07/2023 137 136 - 145 mmol/L Final     Potassium   Date Value Ref Range Status   06/07/2023 3.9 3.5 - 5.1 mmol/L Final     Chloride   Date Value Ref Range Status   06/07/2023 109 (H) 98 - 107 mmol/L Final     CO2   Date Value Ref Range Status   06/07/2023 24 21 - 32 mmol/L Final     Glucose   Date Value Ref Range Status   06/07/2023 100 74 - 106 mg/dL Final     BUN   Date Value Ref Range Status   06/07/2023 12 7 - 18 mg/dL Final     Creatinine   Date Value Ref Range Status   06/07/2023 0.70 0.55 - 1.02 mg/dL Final     Calcium   Date Value Ref Range Status   06/07/2023 8.8 8.5 - 10.1 mg/dL Final     Total Protein   Date Value Ref Range Status   06/07/2023 7.6 6.4 - 8.2 g/dL Final     Albumin   Date Value Ref Range Status   06/07/2023 3.7 3.5 - 5.0 g/dL Final     Bilirubin, Total   Date Value Ref Range Status   06/07/2023 0.3 >0.0 - 1.2 mg/dL Final     Alk Phos   Date Value Ref Range Status   06/07/2023 78 50 - 130 U/L Final     AST   Date Value Ref Range Status   06/07/2023 24 15 - 37 U/L Final     ALT   Date Value Ref Range Status   06/07/2023 23 13 - 56 U/L Final     Anion Gap   Date Value Ref Range Status   06/07/2023 8 7 - 16 mmol/L Final     eGFR   Date Value Ref Range Status   06/07/2023 97 >=60 mL/min/1.73m2 Final        Lab Results   Component Value Date    WBC 4.81 06/07/2023    RBC 4.15 (L) 06/07/2023    HGB 6.8 (L) 06/07/2023    HCT 26.8 (L) 06/07/2023    MCV 64.6 (L) 06/07/2023    MCH 16.4 (L) 06/07/2023    MCHC 25.4 (L) 06/07/2023    RDW 19.1 (H) 06/07/2023     (H) 06/07/2023    MPV 11.2 06/07/2023    LYMPH 25.4 (L) 06/07/2023    LYMPH 1.22 06/07/2023    MONO 8.1 (H) 06/07/2023    EOS 0.08 06/07/2023    BASO 0.07 06/07/2023    EOSINOPHIL 1.7 06/07/2023    BASOPHIL 1.5 (H) 06/07/2023        Lab Results    Component Value Date    CHOL 174 06/07/2023    CHOL 198 08/31/2022    CHOL 205 (H) 12/14/2021     Lab Results   Component Value Date    HDL 55 06/07/2023    HDL 62 (H) 08/31/2022    HDL 55 12/14/2021     Lab Results   Component Value Date    LDLCALC 93 06/07/2023    LDLCALC 109 08/31/2022    LDLCALC 126 12/14/2021     Lab Results   Component Value Date    TRIG 128 06/07/2023    TRIG 133 08/31/2022    TRIG 121 12/14/2021     Lab Results   Component Value Date    CHOLHDL 3.2 06/07/2023    CHOLHDL 3.2 08/31/2022    CHOLHDL 3.7 12/14/2021        Wt Readings from Last 3 Encounters:   06/20/23 1418 55.3 kg (122 lb)   06/07/23 1410 55.2 kg (121 lb 12.8 oz)   09/28/22 1450 54.9 kg (121 lb)        BP Readings from Last 3 Encounters:   06/20/23 (!) 159/89   06/14/23 (!) 158/78   06/07/23 136/75        Review of Systems     Review of Systems   Constitutional:  Positive for appetite change and fatigue.   HENT:  Negative for mouth sores.    Respiratory:  Positive for shortness of breath. Negative for chest tightness.    Cardiovascular:  Negative for chest pain and palpitations.   Gastrointestinal:  Negative for abdominal pain and blood in stool.   Genitourinary:  Negative for difficulty urinating.   Musculoskeletal:  Positive for leg pain and myalgias.   Neurological:  Positive for dizziness, weakness, numbness and headaches. Negative for syncope.   Hematological:  Negative for adenopathy. Does not bruise/bleed easily.      Medical / Social / Family History     Past Medical History:   Diagnosis Date    Anemia     Cervical radiculopathy 12/23/2018    Degenerative joint disease involving multiple joints 12/21/2020    Hypercholesteremia 03/23/2021    Hypertension     Restrictive lung disease 03/23/2021    Temporomandibular joint disorder 04/30/2018       Past Surgical History:   Procedure Laterality Date    APPENDECTOMY      HYSTERECTOMY         Social History  Ms.  reports that she has never smoked. She has never been exposed to  tobacco smoke. She has never used smokeless tobacco. She reports that she does not currently use drugs. She reports that she does not drink alcohol.    Family History  Ms.'s family history includes Cancer in her father; Heart disease in her mother; Kidney disease in her father; Stroke in her mother.    Medications and Allergies     Medications  Outpatient Medications Marked as Taking for the 6/20/23 encounter (Office Visit) with LAYNE De La Torre   Medication Sig Dispense Refill    fluticasone-umeclidin-vilanter (TRELEGY ELLIPTA) 100-62.5-25 mcg DsDv Inhale 1 puff into the lungs once daily.      omeprazole (PRILOSEC) 20 MG capsule TAKE ONE CAPSULE BY MOUTH TWICE DAILY FOR TWO WEEKS THEN MAY decrease TO ONCE DAILY. (Patient taking differently: 20 mg once daily.) 90 capsule 1    [DISCONTINUED] meloxicam (MOBIC) 7.5 MG tablet Take 7.5 mg by mouth daily as needed for Pain.      [DISCONTINUED] pramipexole (MIRAPEX) 0.125 MG tablet 1 po hs x 1 week, then may increase to 2 po hs if needed. 180 tablet 1    [DISCONTINUED] pravastatin (PRAVACHOL) 20 MG tablet TAKE ONE TABLET BY MOUTH ONCE DAILY 90 tablet 1     Current Facility-Administered Medications for the 6/20/23 encounter (Office Visit) with LAYNE De La Torre   Medication Dose Route Frequency Provider Last Rate Last Admin    [DISCONTINUED] acetaminophen tablet 650 mg  650 mg Oral PRN Sheba Grant NP        [DISCONTINUED] diphenhydrAMINE capsule 25 mg  25 mg Oral PRN Sheba Grant NP        [DISCONTINUED] sodium chloride 0.9% flush 10 mL  10 mL Intravenous PRN Sheba Grant NP           Allergies  Review of patient's allergies indicates:   Allergen Reactions    Codeine sulfate Shortness Of Breath       Physical Examination     Vitals:    06/20/23 1418 06/20/23 1424 06/20/23 1507   BP: (!) 172/108 (!) 168/94 (!) 159/89   BP Location: Left arm Left arm Left arm   Patient Position: Sitting Sitting Sitting   Pulse: 78     Resp: 18     Temp: 97.6  "°F (36.4 °C)     TempSrc: Oral     SpO2: 96%     Weight: 55.3 kg (122 lb)     Height: 5' 8" (1.727 m)        Physical Exam  Vitals and nursing note reviewed.   Constitutional:       General: She is not in acute distress.     Appearance: She is not ill-appearing, toxic-appearing or diaphoretic.   HENT:      Right Ear: External ear normal.      Left Ear: External ear normal.      Nose: Nose normal.      Mouth/Throat:      Mouth: Mucous membranes are moist.      Pharynx: Oropharynx is clear. No oropharyngeal exudate or posterior oropharyngeal erythema.   Eyes:      Conjunctiva/sclera: Conjunctivae normal.   Cardiovascular:      Rate and Rhythm: Normal rate and regular rhythm.      Heart sounds: Normal heart sounds.   Pulmonary:      Effort: Pulmonary effort is normal.      Breath sounds: Normal breath sounds.   Musculoskeletal:      Cervical back: Normal range of motion and neck supple.   Skin:     General: Skin is warm and dry.      Capillary Refill: Capillary refill takes less than 2 seconds.      Findings: No bruising.   Neurological:      Mental Status: She is alert and oriented to person, place, and time.   Psychiatric:         Mood and Affect: Mood normal.         Behavior: Behavior normal.        Assessment and Plan (including Health Maintenance)      Problem List  Smart Sets  Document Outside HM   :    Plan:     Patient Instructions   Resume losartan and monitor blood pressure will refer to hematology  She will need medicare transportation for this appointment       Health Maintenance Due   Topic Date Due    Hepatitis C Screening  Never done    COVID-19 Vaccine (1) Never done    HIV Screening  Never done    TETANUS VACCINE  Never done    Mammogram  Never done    Shingles Vaccine (1 of 2) Never done    Colorectal Cancer Screening  09/09/2023       Problem List Items Addressed This Visit          Cardiac/Vascular    Hypertension    Current Assessment & Plan     Not controlled will resume losartan monitor blood " pressure goal 120/80 or less            Oncology    Anemia    Current Assessment & Plan     Repeat lab today add b12 folate TIBC also discussed refer to hematology for further evaluation.          Relevant Orders    CBC Auto Differential    Ambulatory referral/consult to Hematology / Oncology    Iron and TIBC    Vitamin B12 & Folate    Methylmalonic Acid, Serum     Other Visit Diagnoses       Essential hypertension    -  Primary    Relevant Medications    losartan (COZAAR) 50 MG tablet    Idiopathic progressive neuropathy        Relevant Orders    Iron and TIBC    Vitamin B12 & Folate    Methylmalonic Acid, Serum          Essential hypertension  -     losartan (COZAAR) 50 MG tablet; Take 1 tablet (50 mg total) by mouth once daily.  Dispense: 30 tablet; Refill: 0    Anemia, unspecified type  -     CBC Auto Differential; Future; Expected date: 06/20/2023  -     Ambulatory referral/consult to Hematology / Oncology; Future; Expected date: 06/27/2023  -     Iron and TIBC; Future; Expected date: 06/20/2023  -     Vitamin B12 & Folate; Future; Expected date: 06/20/2023  -     Methylmalonic Acid, Serum; Future; Expected date: 06/20/2023    Idiopathic progressive neuropathy  -     Iron and TIBC; Future; Expected date: 06/20/2023  -     Vitamin B12 & Folate; Future; Expected date: 06/20/2023  -     Methylmalonic Acid, Serum; Future; Expected date: 06/20/2023    Hypertension, unspecified type       Health Maintenance Topics with due status: Not Due       Topic Last Completion Date    Lipid Panel 06/07/2023    Influenza Vaccine Not Due         No future appointments.     No follow-ups on file.    Health Maintenance Due   Topic Date Due    Hepatitis C Screening  Never done    COVID-19 Vaccine (1) Never done    HIV Screening  Never done    TETANUS VACCINE  Never done    Mammogram  Never done    Shingles Vaccine (1 of 2) Never done    Colorectal Cancer Screening  09/09/2023        Signature:  LAYNE De La Torre    Date  of encounter: 6/20/23

## 2023-06-20 NOTE — PATIENT INSTRUCTIONS
Resume losartan and monitor blood pressure will refer to hematology  She will need medicare transportation for this appointment

## 2023-06-21 DIAGNOSIS — D50.9 IRON DEFICIENCY ANEMIA, UNSPECIFIED IRON DEFICIENCY ANEMIA TYPE: Primary | ICD-10-CM

## 2023-06-21 LAB
FOLATE SERPL-MCNC: 17.1 NG/ML (ref 3.1–17.5)
IRON SATN MFR SERPL: 9 % (ref 14–50)
IRON SERPL-MCNC: 47 ΜG/DL (ref 50–170)
TIBC SERPL-MCNC: 536 ΜG/DL (ref 250–450)
VIT B12 SERPL-MCNC: 406 PG/ML (ref 193–986)

## 2023-06-21 RX ORDER — FERROUS SULFATE 324(65)MG
324 TABLET, DELAYED RELEASE (ENTERIC COATED) ORAL DAILY
Qty: 30 TABLET | Refills: 2 | Status: SHIPPED | OUTPATIENT
Start: 2023-06-21 | End: 2023-08-21

## 2023-06-23 LAB — METHYLMALONATE SERPL-SCNC: 0.16 NMOL/ML

## 2023-06-30 DIAGNOSIS — K21.9 GASTROESOPHAGEAL REFLUX DISEASE WITHOUT ESOPHAGITIS: ICD-10-CM

## 2023-06-30 RX ORDER — OMEPRAZOLE 20 MG/1
CAPSULE, DELAYED RELEASE ORAL
Qty: 90 CAPSULE | Refills: 1 | Status: SHIPPED | OUTPATIENT
Start: 2023-06-30 | End: 2023-09-13 | Stop reason: SDUPTHER

## 2023-07-05 ENCOUNTER — OFFICE VISIT (OUTPATIENT)
Dept: FAMILY MEDICINE | Facility: CLINIC | Age: 63
End: 2023-07-05
Payer: MEDICARE

## 2023-07-05 VITALS
WEIGHT: 120 LBS | TEMPERATURE: 98 F | BODY MASS INDEX: 18.19 KG/M2 | HEIGHT: 68 IN | SYSTOLIC BLOOD PRESSURE: 166 MMHG | DIASTOLIC BLOOD PRESSURE: 84 MMHG | HEART RATE: 73 BPM | OXYGEN SATURATION: 97 % | RESPIRATION RATE: 18 BRPM

## 2023-07-05 DIAGNOSIS — D64.9 ANEMIA, UNSPECIFIED TYPE: Primary | ICD-10-CM

## 2023-07-05 DIAGNOSIS — I10 HYPERTENSION, UNSPECIFIED TYPE: ICD-10-CM

## 2023-07-05 DIAGNOSIS — K21.9 GASTROESOPHAGEAL REFLUX DISEASE WITHOUT ESOPHAGITIS: ICD-10-CM

## 2023-07-05 LAB
ANISOCYTOSIS BLD QL SMEAR: ABNORMAL
BASOPHILS # BLD AUTO: 0.05 K/UL (ref 0–0.2)
BASOPHILS NFR BLD AUTO: 1.1 % (ref 0–1)
DIFFERENTIAL METHOD BLD: ABNORMAL
EOSINOPHIL # BLD AUTO: 0.1 K/UL (ref 0–0.5)
EOSINOPHIL NFR BLD AUTO: 2.3 % (ref 1–4)
ERYTHROCYTE [DISTWIDTH] IN BLOOD BY AUTOMATED COUNT: 28.1 % (ref 11.5–14.5)
HCT VFR BLD AUTO: 38.6 % (ref 38–47)
HGB BLD-MCNC: 11.1 G/DL (ref 12–16)
HYPOCHROMIA BLD QL SMEAR: ABNORMAL
IMM GRANULOCYTES # BLD AUTO: 0.01 K/UL (ref 0–0.04)
IMM GRANULOCYTES NFR BLD: 0.2 % (ref 0–0.4)
LYMPHOCYTES # BLD AUTO: 0.98 K/UL (ref 1–4.8)
LYMPHOCYTES NFR BLD AUTO: 22.5 % (ref 27–41)
MCH RBC QN AUTO: 21.9 PG (ref 27–31)
MCHC RBC AUTO-ENTMCNC: 28.8 G/DL (ref 32–36)
MCV RBC AUTO: 76 FL (ref 80–96)
MICROCYTES BLD QL SMEAR: ABNORMAL
MONOCYTES # BLD AUTO: 0.37 K/UL (ref 0–0.8)
MONOCYTES NFR BLD AUTO: 8.5 % (ref 2–6)
MPC BLD CALC-MCNC: ABNORMAL G/DL
NEUTROPHILS # BLD AUTO: 2.85 K/UL (ref 1.8–7.7)
NEUTROPHILS NFR BLD AUTO: 65.4 % (ref 53–65)
NRBC # BLD AUTO: 0 X10E3/UL
NRBC, AUTO (.00): 0 %
OVALOCYTES BLD QL SMEAR: ABNORMAL
PLATELET # BLD AUTO: 355 K/UL (ref 150–400)
PLATELET MORPHOLOGY: ABNORMAL
RBC # BLD AUTO: 5.08 M/UL (ref 4.2–5.4)
WBC # BLD AUTO: 4.36 K/UL (ref 4.5–11)

## 2023-07-05 PROCEDURE — 4010F PR ACE/ARB THEARPY RXD/TAKEN: ICD-10-PCS | Mod: ,,, | Performed by: NURSE PRACTITIONER

## 2023-07-05 PROCEDURE — 3066F PR DOCUMENTATION OF TREATMENT FOR NEPHROPATHY: ICD-10-PCS | Mod: ,,, | Performed by: NURSE PRACTITIONER

## 2023-07-05 PROCEDURE — 1159F PR MEDICATION LIST DOCUMENTED IN MEDICAL RECORD: ICD-10-PCS | Mod: ,,, | Performed by: NURSE PRACTITIONER

## 2023-07-05 PROCEDURE — 1159F MED LIST DOCD IN RCRD: CPT | Mod: ,,, | Performed by: NURSE PRACTITIONER

## 2023-07-05 PROCEDURE — 3008F PR BODY MASS INDEX (BMI) DOCUMENTED: ICD-10-PCS | Mod: ,,, | Performed by: NURSE PRACTITIONER

## 2023-07-05 PROCEDURE — 1160F PR REVIEW ALL MEDS BY PRESCRIBER/CLIN PHARMACIST DOCUMENTED: ICD-10-PCS | Mod: ,,, | Performed by: NURSE PRACTITIONER

## 2023-07-05 PROCEDURE — 1160F RVW MEDS BY RX/DR IN RCRD: CPT | Mod: ,,, | Performed by: NURSE PRACTITIONER

## 2023-07-05 PROCEDURE — 99213 OFFICE O/P EST LOW 20 MIN: CPT | Mod: ,,, | Performed by: NURSE PRACTITIONER

## 2023-07-05 PROCEDURE — 99213 PR OFFICE/OUTPT VISIT, EST, LEVL III, 20-29 MIN: ICD-10-PCS | Mod: ,,, | Performed by: NURSE PRACTITIONER

## 2023-07-05 PROCEDURE — 4010F ACE/ARB THERAPY RXD/TAKEN: CPT | Mod: ,,, | Performed by: NURSE PRACTITIONER

## 2023-07-05 PROCEDURE — 85025 COMPLETE CBC W/AUTO DIFF WBC: CPT | Mod: ,,, | Performed by: CLINICAL MEDICAL LABORATORY

## 2023-07-05 PROCEDURE — 3066F NEPHROPATHY DOC TX: CPT | Mod: ,,, | Performed by: NURSE PRACTITIONER

## 2023-07-05 PROCEDURE — 3008F BODY MASS INDEX DOCD: CPT | Mod: ,,, | Performed by: NURSE PRACTITIONER

## 2023-07-05 PROCEDURE — 3061F NEG MICROALBUMINURIA REV: CPT | Mod: ,,, | Performed by: NURSE PRACTITIONER

## 2023-07-05 PROCEDURE — 3079F PR MOST RECENT DIASTOLIC BLOOD PRESSURE 80-89 MM HG: ICD-10-PCS | Mod: ,,, | Performed by: NURSE PRACTITIONER

## 2023-07-05 PROCEDURE — 3077F PR MOST RECENT SYSTOLIC BLOOD PRESSURE >= 140 MM HG: ICD-10-PCS | Mod: ,,, | Performed by: NURSE PRACTITIONER

## 2023-07-05 PROCEDURE — 3079F DIAST BP 80-89 MM HG: CPT | Mod: ,,, | Performed by: NURSE PRACTITIONER

## 2023-07-05 PROCEDURE — 3077F SYST BP >= 140 MM HG: CPT | Mod: ,,, | Performed by: NURSE PRACTITIONER

## 2023-07-05 PROCEDURE — 3061F PR NEG MICROALBUMINURIA RESULT DOCUMENTED/REVIEW: ICD-10-PCS | Mod: ,,, | Performed by: NURSE PRACTITIONER

## 2023-07-05 PROCEDURE — 85025 CBC WITH DIFFERENTIAL: ICD-10-PCS | Mod: ,,, | Performed by: CLINICAL MEDICAL LABORATORY

## 2023-07-05 RX ORDER — AMLODIPINE BESYLATE 5 MG/1
5 TABLET ORAL DAILY
Qty: 30 TABLET | Refills: 2 | Status: SHIPPED | OUTPATIENT
Start: 2023-07-05 | End: 2024-07-04

## 2023-07-05 RX ORDER — FAMOTIDINE 40 MG/1
40 TABLET, FILM COATED ORAL DAILY
Qty: 30 TABLET | Refills: 2 | Status: SHIPPED | OUTPATIENT
Start: 2023-07-05 | End: 2023-09-13 | Stop reason: SDUPTHER

## 2023-07-05 NOTE — PATIENT INSTRUCTIONS
Will repeat CBC and check on referrals  Add norvasc daily for better blood pressure control and continue losartan. try to get a vitamin otc with iron that is better tolerated.   Will change to pepcid to see if better tolerated stop omeprazole

## 2023-07-05 NOTE — ASSESSMENT & PLAN NOTE
Blood pressure is not controlled. Current medical regimen is not effective;   Will adjust according to results and monitor. Add norvasc 5 mg

## 2023-07-05 NOTE — PROGRESS NOTES
LAYNE De La Torre   [facility]  [address]  [place] [address]     PATIENT NAME: Arlette Cisneros  : 1960  DATE: 23  MRN: [id number]      Billing Provider: LAYNE De La Torre  Level of Service:   Patient PCP Information       Provider PCP Type    Sheba Grant NP General            Reason for Visit / Chief Complaint: Headache, Follow-up (Has not heard from hematology or pulmonology referrals, will follow up on those. Unable to take iron as it makes her sick. States prilosec also makes her sick), and Back Pain (C/o left side back pain)   Health Maintenance Due   Topic Date Due    Hepatitis C Screening  Never done    COVID-19 Vaccine (1) Never done    HIV Screening  Never done    TETANUS VACCINE  Never done    Mammogram  Never done    Shingles Vaccine (1 of 2) Never done    Colorectal Cancer Screening  2023          Update PCP  Update Chief Complaint         History of Present Illness / Problem Focused Workflow     Arlette Cisneros presents to the clinic follow up anemia hypertention, she is unable to tolerate iron and reports omeprazole makes her feel nauseated and sick. She does not have a blood pressure monitor but has resumed her losartan- it remains elevated     Hemoglobin A1C   Date Value Ref Range Status   2022 5.9 4.5 - 6.6 % Final     Comment:       Normal:               <5.7%  Pre-Diabetic:       5.7% to 6.4%  Diabetic:             >6.4%  Diabetic Goal:     <7%        CMP  Sodium   Date Value Ref Range Status   2023 137 136 - 145 mmol/L Final     Potassium   Date Value Ref Range Status   2023 3.9 3.5 - 5.1 mmol/L Final     Chloride   Date Value Ref Range Status   2023 109 (H) 98 - 107 mmol/L Final     CO2   Date Value Ref Range Status   2023 24 21 - 32 mmol/L Final     Glucose   Date Value Ref Range Status   2023 100 74 - 106 mg/dL Final     BUN   Date Value Ref Range Status   2023 12 7 - 18 mg/dL Final      Creatinine   Date Value Ref Range Status   06/07/2023 0.70 0.55 - 1.02 mg/dL Final     Calcium   Date Value Ref Range Status   06/07/2023 8.8 8.5 - 10.1 mg/dL Final     Total Protein   Date Value Ref Range Status   06/07/2023 7.6 6.4 - 8.2 g/dL Final     Albumin   Date Value Ref Range Status   06/07/2023 3.7 3.5 - 5.0 g/dL Final     Bilirubin, Total   Date Value Ref Range Status   06/07/2023 0.3 >0.0 - 1.2 mg/dL Final     Alk Phos   Date Value Ref Range Status   06/07/2023 78 50 - 130 U/L Final     AST   Date Value Ref Range Status   06/07/2023 24 15 - 37 U/L Final     ALT   Date Value Ref Range Status   06/07/2023 23 13 - 56 U/L Final     Anion Gap   Date Value Ref Range Status   06/07/2023 8 7 - 16 mmol/L Final     eGFR   Date Value Ref Range Status   06/07/2023 97 >=60 mL/min/1.73m2 Final        Lab Results   Component Value Date    WBC 4.51 06/20/2023    RBC 5.41 (H) 06/20/2023    HGB 11.2 (L) 06/20/2023    HCT 40.1 06/20/2023    MCV 74.1 (L) 06/20/2023    MCH 20.7 (L) 06/20/2023    MCHC 27.9 (L) 06/20/2023    RDW 27.8 (H) 06/20/2023     06/20/2023    MPV 11.2 06/07/2023    LYMPH 25.7 (L) 06/20/2023    LYMPH 1.16 06/20/2023    MONO 10.4 (H) 06/20/2023    EOS 0.09 06/20/2023    BASO 0.07 06/20/2023    EOSINOPHIL 2.0 06/20/2023    BASOPHIL 1.6 (H) 06/20/2023        Lab Results   Component Value Date    CHOL 174 06/07/2023    CHOL 198 08/31/2022    CHOL 205 (H) 12/14/2021     Lab Results   Component Value Date    HDL 55 06/07/2023    HDL 62 (H) 08/31/2022    HDL 55 12/14/2021     Lab Results   Component Value Date    LDLCALC 93 06/07/2023    LDLCALC 109 08/31/2022    LDLCALC 126 12/14/2021     Lab Results   Component Value Date    TRIG 128 06/07/2023    TRIG 133 08/31/2022    TRIG 121 12/14/2021     Lab Results   Component Value Date    CHOLHDL 3.2 06/07/2023    CHOLHDL 3.2 08/31/2022    CHOLHDL 3.7 12/14/2021        Wt Readings from Last 3 Encounters:   07/05/23 0846 54.4 kg (120 lb)   06/20/23 1418  55.3 kg (122 lb)   06/07/23 1410 55.2 kg (121 lb 12.8 oz)        BP Readings from Last 3 Encounters:   07/05/23 (!) 166/84   06/20/23 (!) 159/89   06/14/23 (!) 158/78        Review of Systems     Review of Systems   Constitutional:  Positive for appetite change and fatigue.   HENT:  Negative for mouth sores.    Respiratory:  Positive for shortness of breath. Negative for chest tightness.    Cardiovascular:  Negative for chest pain and palpitations.   Gastrointestinal:  Negative for abdominal pain and blood in stool.   Genitourinary:  Negative for difficulty urinating.   Musculoskeletal:  Positive for leg pain and myalgias.   Neurological:  Positive for dizziness, weakness, numbness and headaches. Negative for syncope.   Hematological:  Negative for adenopathy. Does not bruise/bleed easily.      Medical / Social / Family History     Past Medical History:   Diagnosis Date    Anemia     Cervical radiculopathy 12/23/2018    Degenerative joint disease involving multiple joints 12/21/2020    Hypercholesteremia 03/23/2021    Hypertension     Restrictive lung disease 03/23/2021    Temporomandibular joint disorder 04/30/2018       Past Surgical History:   Procedure Laterality Date    APPENDECTOMY      HYSTERECTOMY         Social History  Ms.  reports that she has never smoked. She has never been exposed to tobacco smoke. She has never used smokeless tobacco. She reports that she does not currently use drugs. She reports that she does not drink alcohol.    Family History  Ms.'s family history includes Cancer in her father; Heart disease in her mother; Kidney disease in her father; Stroke in her mother.    Medications and Allergies     Medications  Outpatient Medications Marked as Taking for the 7/5/23 encounter (Office Visit) with LAYNE De La Torre   Medication Sig Dispense Refill    fluticasone-umeclidin-vilanter (TRELEGY ELLIPTA) 100-62.5-25 mcg DsDv Inhale 1 puff into the lungs once daily.      losartan  "(COZAAR) 50 MG tablet Take 1 tablet (50 mg total) by mouth once daily. 30 tablet 0    omeprazole (PRILOSEC) 20 MG capsule TAKE ONE CAPSULE BY MOUTH TWICE DAILY FOR TWO WEEKS THEN MAY decrease TO ONCE DAILY. 90 capsule 1       Allergies  Review of patient's allergies indicates:   Allergen Reactions    Codeine sulfate Shortness Of Breath       Physical Examination     Vitals:    07/05/23 0846 07/05/23 0852 07/05/23 0923   BP: (!) 179/83 (!) 173/93 (!) 166/84   BP Location: Right arm Right arm Right arm   Patient Position: Sitting Sitting    Pulse: 73     Resp: 18     Temp: 98 °F (36.7 °C)     TempSrc: Oral     SpO2: 97%     Weight: 54.4 kg (120 lb)     Height: 5' 8" (1.727 m)        Physical Exam  Vitals and nursing note reviewed.   Constitutional:       General: She is not in acute distress.     Appearance: She is not ill-appearing, toxic-appearing or diaphoretic.   HENT:      Right Ear: External ear normal.      Left Ear: External ear normal.      Nose: Nose normal.      Mouth/Throat:      Mouth: Mucous membranes are moist.      Pharynx: Oropharynx is clear. No oropharyngeal exudate or posterior oropharyngeal erythema.   Eyes:      Conjunctiva/sclera: Conjunctivae normal.   Cardiovascular:      Rate and Rhythm: Normal rate and regular rhythm.      Heart sounds: Normal heart sounds.   Pulmonary:      Effort: Pulmonary effort is normal.      Breath sounds: Normal breath sounds.   Musculoskeletal:      Cervical back: Normal range of motion and neck supple.   Skin:     General: Skin is warm and dry.      Capillary Refill: Capillary refill takes less than 2 seconds.      Findings: No bruising.   Neurological:      Mental Status: She is alert and oriented to person, place, and time.   Psychiatric:         Mood and Affect: Mood normal.         Behavior: Behavior normal.        Assessment and Plan (including Health Maintenance)      Problem List  Smart Sets  Document Outside HM   :    Plan:     Patient Instructions   Will " repeat CBC and check on referrals  Add norvasc daily for better blood pressure control and continue losartan. try to get a vitamin otc with iron that is better tolerated.   Will change to pepcid to see if better tolerated stop omeprazole       Health Maintenance Due   Topic Date Due    Hepatitis C Screening  Never done    COVID-19 Vaccine (1) Never done    HIV Screening  Never done    TETANUS VACCINE  Never done    Mammogram  Never done    Shingles Vaccine (1 of 2) Never done    Colorectal Cancer Screening  09/09/2023       Problem List Items Addressed This Visit          Cardiac/Vascular    Hypertension    Current Assessment & Plan     Blood pressure is not controlled. Current medical regimen is not effective;   Will adjust according to results and monitor. Add norvasc 5 mg          Relevant Medications    amLODIPine (NORVASC) 5 MG tablet       Oncology    Anemia - Primary    Current Assessment & Plan     Repeat CBC today will look for another iron supplement         Relevant Orders    CBC Auto Differential       GI    Gastroesophageal reflux disease without esophagitis    Relevant Medications    famotidine (PEPCID) 40 MG tablet     Anemia, unspecified type  -     CBC Auto Differential; Future; Expected date: 07/05/2023    Hypertension, unspecified type  -     amLODIPine (NORVASC) 5 MG tablet; Take 1 tablet (5 mg total) by mouth once daily.  Dispense: 30 tablet; Refill: 2    Gastroesophageal reflux disease without esophagitis  -     famotidine (PEPCID) 40 MG tablet; Take 1 tablet (40 mg total) by mouth once daily.  Dispense: 30 tablet; Refill: 2       Health Maintenance Topics with due status: Not Due       Topic Last Completion Date    Lipid Panel 06/07/2023    Influenza Vaccine Not Due         Future Appointments   Date Time Provider Department Center   7/12/2023  3:30 PM Pete Vasquez MD [facility]   7/19/2023  2:15 PM LAYNE De La Torre [facility]   9/5/2023  3:00 PM  Sheba Grant NP [facility]   9/13/2023  8:30 AM AWV NURSEJUSTIN [facility]        Follow up in about 2 weeks (around 7/19/2023).    Health Maintenance Due   Topic Date Due    Hepatitis C Screening  Never done    COVID-19 Vaccine (1) Never done    HIV Screening  Never done    TETANUS VACCINE  Never done    Mammogram  Never done    Shingles Vaccine (1 of 2) Never done    Colorectal Cancer Screening  09/09/2023        Signature:  LAYNE De La Torre    Date of encounter: 7/5/23

## 2023-07-12 ENCOUNTER — OFFICE VISIT (OUTPATIENT)
Dept: PULMONOLOGY | Facility: CLINIC | Age: 63
End: 2023-07-12
Payer: MEDICARE

## 2023-07-12 VITALS
WEIGHT: 123 LBS | BODY MASS INDEX: 18.64 KG/M2 | HEIGHT: 68 IN | SYSTOLIC BLOOD PRESSURE: 130 MMHG | OXYGEN SATURATION: 98 % | DIASTOLIC BLOOD PRESSURE: 88 MMHG | HEART RATE: 89 BPM

## 2023-07-12 DIAGNOSIS — R06.02 SOB (SHORTNESS OF BREATH): ICD-10-CM

## 2023-07-12 DIAGNOSIS — J98.4 RESTRICTIVE AIRWAY DISEASE: ICD-10-CM

## 2023-07-12 PROCEDURE — 3008F PR BODY MASS INDEX (BMI) DOCUMENTED: ICD-10-PCS | Mod: CPTII,,, | Performed by: INTERNAL MEDICINE

## 2023-07-12 PROCEDURE — 3075F PR MOST RECENT SYSTOLIC BLOOD PRESS GE 130-139MM HG: ICD-10-PCS | Mod: CPTII,,, | Performed by: INTERNAL MEDICINE

## 2023-07-12 PROCEDURE — 3079F PR MOST RECENT DIASTOLIC BLOOD PRESSURE 80-89 MM HG: ICD-10-PCS | Mod: CPTII,,, | Performed by: INTERNAL MEDICINE

## 2023-07-12 PROCEDURE — 99214 OFFICE O/P EST MOD 30 MIN: CPT | Mod: PBBFAC | Performed by: INTERNAL MEDICINE

## 2023-07-12 PROCEDURE — 3075F SYST BP GE 130 - 139MM HG: CPT | Mod: CPTII,,, | Performed by: INTERNAL MEDICINE

## 2023-07-12 PROCEDURE — 3061F NEG MICROALBUMINURIA REV: CPT | Mod: CPTII,,, | Performed by: INTERNAL MEDICINE

## 2023-07-12 PROCEDURE — 3061F PR NEG MICROALBUMINURIA RESULT DOCUMENTED/REVIEW: ICD-10-PCS | Mod: CPTII,,, | Performed by: INTERNAL MEDICINE

## 2023-07-12 PROCEDURE — 99203 OFFICE O/P NEW LOW 30 MIN: CPT | Mod: S$PBB,,, | Performed by: INTERNAL MEDICINE

## 2023-07-12 PROCEDURE — 4010F ACE/ARB THERAPY RXD/TAKEN: CPT | Mod: CPTII,,, | Performed by: INTERNAL MEDICINE

## 2023-07-12 PROCEDURE — 3066F PR DOCUMENTATION OF TREATMENT FOR NEPHROPATHY: ICD-10-PCS | Mod: CPTII,,, | Performed by: INTERNAL MEDICINE

## 2023-07-12 PROCEDURE — 3008F BODY MASS INDEX DOCD: CPT | Mod: CPTII,,, | Performed by: INTERNAL MEDICINE

## 2023-07-12 PROCEDURE — 3066F NEPHROPATHY DOC TX: CPT | Mod: CPTII,,, | Performed by: INTERNAL MEDICINE

## 2023-07-12 PROCEDURE — 99203 PR OFFICE/OUTPT VISIT, NEW, LEVL III, 30-44 MIN: ICD-10-PCS | Mod: S$PBB,,, | Performed by: INTERNAL MEDICINE

## 2023-07-12 PROCEDURE — 3079F DIAST BP 80-89 MM HG: CPT | Mod: CPTII,,, | Performed by: INTERNAL MEDICINE

## 2023-07-12 PROCEDURE — 1159F PR MEDICATION LIST DOCUMENTED IN MEDICAL RECORD: ICD-10-PCS | Mod: CPTII,,, | Performed by: INTERNAL MEDICINE

## 2023-07-12 PROCEDURE — 4010F PR ACE/ARB THEARPY RXD/TAKEN: ICD-10-PCS | Mod: CPTII,,, | Performed by: INTERNAL MEDICINE

## 2023-07-12 PROCEDURE — 1159F MED LIST DOCD IN RCRD: CPT | Mod: CPTII,,, | Performed by: INTERNAL MEDICINE

## 2023-07-12 RX ORDER — ALBUTEROL SULFATE 90 UG/1
2 AEROSOL, METERED RESPIRATORY (INHALATION) EVERY 6 HOURS PRN
Qty: 18 G | Refills: 5 | Status: SHIPPED | OUTPATIENT
Start: 2023-07-12

## 2023-07-13 NOTE — ASSESSMENT & PLAN NOTE
Patient was sent for evaluation of shortness of breath.  She is never smoked she is been told she has restrictive lung disease.  She has some lateral chest wall pain that is unrelated.  My plan start her on some p.r.n. rescue Ventolin and go ahead and get PFTs and a chest x-ray

## 2023-07-13 NOTE — PROGRESS NOTES
Subjective:       Patient ID: Arlette Cisneros is a 62 y.o. female.    Chief Complaint: Restrictive airway disease    Shortness of Breath  This is a chronic problem. The current episode started more than 1 month ago. The problem occurs daily. The problem has been unchanged. Associated symptoms include chest pain. The symptoms are aggravated by exercise.   Past Medical History:   Diagnosis Date    Anemia     Cervical radiculopathy 12/23/2018    Degenerative joint disease involving multiple joints 12/21/2020    Hypercholesteremia 03/23/2021    Hypertension     Restrictive lung disease 03/23/2021    Temporomandibular joint disorder 04/30/2018     Past Surgical History:   Procedure Laterality Date    APPENDECTOMY      HYSTERECTOMY       Family History   Problem Relation Age of Onset    Stroke Mother     Heart disease Mother     Cancer Father     Kidney disease Father      Review of patient's allergies indicates:   Allergen Reactions    Codeine sulfate Shortness Of Breath      Social History     Tobacco Use    Smoking status: Never     Passive exposure: Never    Smokeless tobacco: Never   Substance Use Topics    Alcohol use: Never    Drug use: Not Currently      Review of Systems   Respiratory:  Positive for chest tightness and shortness of breath.    Cardiovascular:  Positive for chest pain.     Objective:      Physical Exam   Constitutional: She is oriented to person, place, and time. She appears well-developed and well-nourished.   HENT:   Head: Normocephalic.   Nose: Nose normal.   Mouth/Throat: Oropharynx is clear and moist.   Neck: No JVD present. No thyromegaly present.   Cardiovascular: Normal rate, regular rhythm, normal heart sounds and intact distal pulses.   Pulmonary/Chest: Normal expansion, hyperinflation, symmetric chest wall expansion, effort normal and breath sounds normal.   Abdominal: Soft. Bowel sounds are normal.   Musculoskeletal:         General: Normal range of motion.      Cervical back: Normal  range of motion and neck supple.   Lymphadenopathy: No supraclavicular adenopathy is present.     She has no cervical adenopathy.   Neurological: She is alert and oriented to person, place, and time. She has normal reflexes.   Skin: Skin is warm and dry.   Psychiatric: She has a normal mood and affect. Her behavior is normal.   Personal Diagnostic Review  none pertinent    No flowsheet data found.      Assessment:       1. Restrictive airway disease    2. SOB (shortness of breath)        Outpatient Encounter Medications as of 7/12/2023   Medication Sig Dispense Refill    amLODIPine (NORVASC) 5 MG tablet Take 1 tablet (5 mg total) by mouth once daily. 30 tablet 2    famotidine (PEPCID) 40 MG tablet Take 1 tablet (40 mg total) by mouth once daily. 30 tablet 2    fluticasone-umeclidin-vilanter (TRELEGY ELLIPTA) 100-62.5-25 mcg DsDv Inhale 1 puff into the lungs once daily.      omeprazole (PRILOSEC) 20 MG capsule TAKE ONE CAPSULE BY MOUTH TWICE DAILY FOR TWO WEEKS THEN MAY decrease TO ONCE DAILY. 90 capsule 1    albuterol (VENTOLIN HFA) 90 mcg/actuation inhaler Inhale 2 puffs into the lungs every 6 (six) hours as needed for Wheezing. Rescue 18 g 5    ferrous sulfate 324 mg (65 mg iron) TbEC Take 1 tablet (324 mg total) by mouth once daily. (Patient not taking: Reported on 7/5/2023) 30 tablet 2    losartan (COZAAR) 50 MG tablet Take 1 tablet (50 mg total) by mouth once daily. 30 tablet 0     No facility-administered encounter medications on file as of 7/12/2023.     Orders Placed This Encounter   Procedures    X-Ray Chest PA And Lateral     Standing Status:   Future     Standing Expiration Date:   7/12/2024     Order Specific Question:   May the Radiologist modify the order per protocol to meet the clinical needs of the patient?     Answer:   Yes    Complete PFT with bronchodilator     Standing Status:   Future     Standing Expiration Date:   7/12/2024     Order Specific Question:   Release to patient     Answer:    Immediate       Plan:       Problem List Items Addressed This Visit          Pulmonary    Restrictive airway disease    SOB (shortness of breath)     Patient was sent for evaluation of shortness of breath.  She is never smoked she is been told she has restrictive lung disease.  She has some lateral chest wall pain that is unrelated.  My plan start her on some p.r.n. rescue Ventolin and go ahead and get PFTs and a chest x-ray         Relevant Medications    albuterol (VENTOLIN HFA) 90 mcg/actuation inhaler    Other Relevant Orders    Complete PFT with bronchodilator    X-Ray Chest PA And Lateral

## 2023-07-19 ENCOUNTER — OFFICE VISIT (OUTPATIENT)
Dept: FAMILY MEDICINE | Facility: CLINIC | Age: 63
End: 2023-07-19
Payer: MEDICARE

## 2023-07-19 VITALS
DIASTOLIC BLOOD PRESSURE: 80 MMHG | OXYGEN SATURATION: 97 % | HEART RATE: 80 BPM | RESPIRATION RATE: 18 BRPM | SYSTOLIC BLOOD PRESSURE: 131 MMHG | BODY MASS INDEX: 17.73 KG/M2 | TEMPERATURE: 98 F | HEIGHT: 68 IN | WEIGHT: 117 LBS

## 2023-07-19 DIAGNOSIS — K21.9 GASTROESOPHAGEAL REFLUX DISEASE WITHOUT ESOPHAGITIS: ICD-10-CM

## 2023-07-19 DIAGNOSIS — D64.9 ANEMIA, UNSPECIFIED TYPE: ICD-10-CM

## 2023-07-19 DIAGNOSIS — G44.209 ACUTE NON INTRACTABLE TENSION-TYPE HEADACHE: ICD-10-CM

## 2023-07-19 DIAGNOSIS — I10 HYPERTENSION, UNSPECIFIED TYPE: Primary | ICD-10-CM

## 2023-07-19 PROCEDURE — 1160F RVW MEDS BY RX/DR IN RCRD: CPT | Mod: ,,, | Performed by: NURSE PRACTITIONER

## 2023-07-19 PROCEDURE — 3061F NEG MICROALBUMINURIA REV: CPT | Mod: ,,, | Performed by: NURSE PRACTITIONER

## 2023-07-19 PROCEDURE — 3061F PR NEG MICROALBUMINURIA RESULT DOCUMENTED/REVIEW: ICD-10-PCS | Mod: ,,, | Performed by: NURSE PRACTITIONER

## 2023-07-19 PROCEDURE — 99212 PR OFFICE/OUTPT VISIT, EST, LEVL II, 10-19 MIN: ICD-10-PCS | Mod: ,,, | Performed by: NURSE PRACTITIONER

## 2023-07-19 PROCEDURE — 3008F PR BODY MASS INDEX (BMI) DOCUMENTED: ICD-10-PCS | Mod: ,,, | Performed by: NURSE PRACTITIONER

## 2023-07-19 PROCEDURE — 3008F BODY MASS INDEX DOCD: CPT | Mod: ,,, | Performed by: NURSE PRACTITIONER

## 2023-07-19 PROCEDURE — 3066F NEPHROPATHY DOC TX: CPT | Mod: ,,, | Performed by: NURSE PRACTITIONER

## 2023-07-19 PROCEDURE — 99212 OFFICE O/P EST SF 10 MIN: CPT | Mod: ,,, | Performed by: NURSE PRACTITIONER

## 2023-07-19 PROCEDURE — 1159F MED LIST DOCD IN RCRD: CPT | Mod: ,,, | Performed by: NURSE PRACTITIONER

## 2023-07-19 PROCEDURE — 3079F PR MOST RECENT DIASTOLIC BLOOD PRESSURE 80-89 MM HG: ICD-10-PCS | Mod: ,,, | Performed by: NURSE PRACTITIONER

## 2023-07-19 PROCEDURE — 4010F PR ACE/ARB THEARPY RXD/TAKEN: ICD-10-PCS | Mod: ,,, | Performed by: NURSE PRACTITIONER

## 2023-07-19 PROCEDURE — 1159F PR MEDICATION LIST DOCUMENTED IN MEDICAL RECORD: ICD-10-PCS | Mod: ,,, | Performed by: NURSE PRACTITIONER

## 2023-07-19 PROCEDURE — 3079F DIAST BP 80-89 MM HG: CPT | Mod: ,,, | Performed by: NURSE PRACTITIONER

## 2023-07-19 PROCEDURE — 3075F PR MOST RECENT SYSTOLIC BLOOD PRESS GE 130-139MM HG: ICD-10-PCS | Mod: ,,, | Performed by: NURSE PRACTITIONER

## 2023-07-19 PROCEDURE — 1160F PR REVIEW ALL MEDS BY PRESCRIBER/CLIN PHARMACIST DOCUMENTED: ICD-10-PCS | Mod: ,,, | Performed by: NURSE PRACTITIONER

## 2023-07-19 PROCEDURE — 3075F SYST BP GE 130 - 139MM HG: CPT | Mod: ,,, | Performed by: NURSE PRACTITIONER

## 2023-07-19 PROCEDURE — 4010F ACE/ARB THERAPY RXD/TAKEN: CPT | Mod: ,,, | Performed by: NURSE PRACTITIONER

## 2023-07-19 PROCEDURE — 3066F PR DOCUMENTATION OF TREATMENT FOR NEPHROPATHY: ICD-10-PCS | Mod: ,,, | Performed by: NURSE PRACTITIONER

## 2023-07-19 NOTE — PATIENT INSTRUCTIONS
Pharmacologic therapy:  - Maalox or Mylanta prn   - carafate and viscous lidocaine ordered for symptomatic relief  - trial of H2 blocker  - continue PPI    Lifestyle modifications:  - Instructed to avoid provocative foods (dec ETOH, cafffeine, spicy foods, chocolate, fatty foods)  - tobacco cessation  - elevate head of bed 30 degrees    Lifestyle modifications for acid reflux:      Keep working on weight loss.    Elevation of the head of the bed may help if you're having nighttime symptoms (eg, cough, hoarseness, throat clearing). This can be achieved either by putting six- to eight-inch blocks under the legs at the head of the bed or a Styrofoam wedge under the mattress.     Try not to lie down at least 30 minutes to an hour after a meal.    Don't eat anything two to three hours before bedtime.    Some common triggers include caffeine, chocolate, spicy foods, food with high fat content, carbonated beverages, and peppermint. Try to see if there is any connection with these foods and eliminate them from your diet.     Chewing sugar free gum might help with your symptoms as the increased salivary flow will help to neutralize the stomach acid.     Start headache diary  1. Keep a headache calendar. Include the time of onset, foods eaten within the past few hours, any smells or activities that could have attributed to the headache.      Pay close attention to foods containing artificial preservatives (nitrites), colorings (red dye), flavorings (MSG) as well as those containing caffeine and chocolate. Some aged cheeses have also been common triggers.  2. Increase your water intake. You need to drink at least 1.5 liters daily. If the drink has caffeine in it, this does not count toward the 1.5 liters since caffeine makes you urinate. Drink enough so that your urine is almost clear.  3. For an acute headache, take your medication at the very first sign of the headache. Take the medication with at least 6-8 ounces of a  caffeinated  non diet beverage, in other words one with caffeine and sugar. Do not take pain medication more than 3 days a week as this can lead to rebound headaches.  4. Adequate sleep is a must for headache management. Lack of sleep is a big trigger for headaches. You should sleep at least 8 hours a night. Up to 10 hours is better.        5. Proper nutrition and eating at least 2 protein based meals a day is important for headache prevention. Breakfast must be eaten on a daily basis.   This does not  have to be a big meal, just something that contains protein so your body has ?fuel? for the day (cereal, peanut butter crackers, boiled egg, eggs, smoothie, etc.)     Headache:  Discussed expected course of headache, considerations, risk factors and future prevention, Discussed treatment options for headache, Discussed use of pain relief medicine and alternatives, Discussed warning signs requiring additional medical attention for headache

## 2023-07-19 NOTE — PROGRESS NOTES
LAYNE De La Torre   [facility]  [address]  [place] [address]     PATIENT NAME: Arlette Cisneros  : 1960  DATE: 23  MRN: [id number]      Billing Provider: LAYNE De La Torre  Level of Service:   Patient PCP Information       Provider PCP Type    Sheba Grant NP General            Reason for Visit / Chief Complaint: Anemia (2 week follow up)   Health Maintenance Due   Topic Date Due    Hepatitis C Screening  Never done    COVID-19 Vaccine (1) Never done    HIV Screening  Never done    TETANUS VACCINE  Never done    Mammogram  Never done    Shingles Vaccine (1 of 2) Never done    Colorectal Cancer Screening  2023          Update PCP  Update Chief Complaint         History of Present Illness / Problem Focused Workflow     Arlette Cisneros presents to the clinic 2 week follow up blood pressure anemia GERD she reports having headaches recently. Has not been able to find anything for her iron that is palatable so she has not started this, she has appointment with hematology August 3. She is taking her blood pressure medication and Pepcid    Headache  Patient presents for evaluation of headache. Symptoms began about 1 week ago. Generally, the headaches last about 3 hours and occur every evening. The headaches are usually worse in the evening. The headaches are usually dull, poorly described, and throbbing and are located in right.  The patient rates her most severe headaches a 9 on a scale from 1 to 10. Recently, the headaches have been increasing in both severity and frequency. Work attendance or other daily activities are affected by the headaches. Precipitating factors include: stress and sun. The headaches are usually not preceded by an aura. Associated neurologic symptoms:  photophobia and nausea with occasional vomiting . The patient denies dizziness, loss of balance, muscle weakness, numbness of extremities, speech difficulties, vision problems, and  vomiting in the early morning. Home treatment has included  BC powder and darkening the room with some improvement. Other history includes: nothing pertinent. Family history includes no known family members with significant headaches.      Hemoglobin A1C   Date Value Ref Range Status   09/28/2022 5.9 4.5 - 6.6 % Final     Comment:       Normal:               <5.7%  Pre-Diabetic:       5.7% to 6.4%  Diabetic:             >6.4%  Diabetic Goal:     <7%        CMP  Sodium   Date Value Ref Range Status   06/07/2023 137 136 - 145 mmol/L Final     Potassium   Date Value Ref Range Status   06/07/2023 3.9 3.5 - 5.1 mmol/L Final     Chloride   Date Value Ref Range Status   06/07/2023 109 (H) 98 - 107 mmol/L Final     CO2   Date Value Ref Range Status   06/07/2023 24 21 - 32 mmol/L Final     Glucose   Date Value Ref Range Status   06/07/2023 100 74 - 106 mg/dL Final     BUN   Date Value Ref Range Status   06/07/2023 12 7 - 18 mg/dL Final     Creatinine   Date Value Ref Range Status   06/07/2023 0.70 0.55 - 1.02 mg/dL Final     Calcium   Date Value Ref Range Status   06/07/2023 8.8 8.5 - 10.1 mg/dL Final     Total Protein   Date Value Ref Range Status   06/07/2023 7.6 6.4 - 8.2 g/dL Final     Albumin   Date Value Ref Range Status   06/07/2023 3.7 3.5 - 5.0 g/dL Final     Bilirubin, Total   Date Value Ref Range Status   06/07/2023 0.3 >0.0 - 1.2 mg/dL Final     Alk Phos   Date Value Ref Range Status   06/07/2023 78 50 - 130 U/L Final     AST   Date Value Ref Range Status   06/07/2023 24 15 - 37 U/L Final     ALT   Date Value Ref Range Status   06/07/2023 23 13 - 56 U/L Final     Anion Gap   Date Value Ref Range Status   06/07/2023 8 7 - 16 mmol/L Final     eGFR   Date Value Ref Range Status   06/07/2023 97 >=60 mL/min/1.73m2 Final        Lab Results   Component Value Date    WBC 4.36 (L) 07/05/2023    RBC 5.08 07/05/2023    HGB 11.1 (L) 07/05/2023    HCT 38.6 07/05/2023    MCV 76.0 (L) 07/05/2023    MCH 21.9 (L) 07/05/2023     MCHC 28.8 (L) 07/05/2023    RDW 28.1 (H) 07/05/2023     07/05/2023    MPV 11.2 06/07/2023    LYMPH 22.5 (L) 07/05/2023    LYMPH 0.98 (L) 07/05/2023    MONO 8.5 (H) 07/05/2023    EOS 0.10 07/05/2023    BASO 0.05 07/05/2023    EOSINOPHIL 2.3 07/05/2023    BASOPHIL 1.1 (H) 07/05/2023        Lab Results   Component Value Date    CHOL 174 06/07/2023    CHOL 198 08/31/2022    CHOL 205 (H) 12/14/2021     Lab Results   Component Value Date    HDL 55 06/07/2023    HDL 62 (H) 08/31/2022    HDL 55 12/14/2021     Lab Results   Component Value Date    LDLCALC 93 06/07/2023    LDLCALC 109 08/31/2022    LDLCALC 126 12/14/2021     Lab Results   Component Value Date    TRIG 128 06/07/2023    TRIG 133 08/31/2022    TRIG 121 12/14/2021     Lab Results   Component Value Date    CHOLHDL 3.2 06/07/2023    CHOLHDL 3.2 08/31/2022    CHOLHDL 3.7 12/14/2021        Wt Readings from Last 3 Encounters:   07/19/23 1353 53.1 kg (117 lb)   07/12/23 1452 55.8 kg (123 lb)   07/05/23 0846 54.4 kg (120 lb)        BP Readings from Last 3 Encounters:   07/19/23 131/80   07/12/23 130/88   07/05/23 (!) 166/84        Review of Systems     Review of Systems   Constitutional:  Positive for appetite change and fatigue.   HENT:  Negative for mouth sores.    Respiratory:  Positive for shortness of breath. Negative for chest tightness.    Cardiovascular:  Negative for chest pain and palpitations.   Gastrointestinal:  Positive for nausea and vomiting. Negative for abdominal pain and blood in stool.   Genitourinary:  Negative for difficulty urinating.   Musculoskeletal:  Positive for leg pain and myalgias.   Neurological:  Positive for dizziness, weakness, numbness and headaches. Negative for syncope.   Hematological:  Negative for adenopathy. Does not bruise/bleed easily.      Medical / Social / Family History     Past Medical History:   Diagnosis Date    Anemia     Cervical radiculopathy 12/23/2018    Degenerative joint disease involving multiple joints  "12/21/2020    Hypercholesteremia 03/23/2021    Hypertension     Restrictive lung disease 03/23/2021    Temporomandibular joint disorder 04/30/2018       Past Surgical History:   Procedure Laterality Date    APPENDECTOMY      HYSTERECTOMY         Social History  Ms.  reports that she has never smoked. She has never been exposed to tobacco smoke. She has never used smokeless tobacco. She reports that she does not currently use drugs. She reports that she does not drink alcohol.    Family History  Ms.'s family history includes Cancer in her father; Heart disease in her mother; Kidney disease in her father; Stroke in her mother.    Medications and Allergies     Medications  Outpatient Medications Marked as Taking for the 7/19/23 encounter (Office Visit) with LAYNE De La Torre   Medication Sig Dispense Refill    albuterol (VENTOLIN HFA) 90 mcg/actuation inhaler Inhale 2 puffs into the lungs every 6 (six) hours as needed for Wheezing. Rescue 18 g 5    amLODIPine (NORVASC) 5 MG tablet Take 1 tablet (5 mg total) by mouth once daily. 30 tablet 2    famotidine (PEPCID) 40 MG tablet Take 1 tablet (40 mg total) by mouth once daily. 30 tablet 2    fluticasone-umeclidin-vilanter (TRELEGY ELLIPTA) 100-62.5-25 mcg DsDv Inhale 1 puff into the lungs once daily.      losartan (COZAAR) 50 MG tablet Take 1 tablet (50 mg total) by mouth once daily. 30 tablet 0    omeprazole (PRILOSEC) 20 MG capsule TAKE ONE CAPSULE BY MOUTH TWICE DAILY FOR TWO WEEKS THEN MAY decrease TO ONCE DAILY. 90 capsule 1       Allergies  Review of patient's allergies indicates:   Allergen Reactions    Codeine sulfate Shortness Of Breath       Physical Examination     Vitals:    07/19/23 1353 07/19/23 1439   BP: (!) 144/82 131/80   BP Location: Left arm Right arm   Patient Position: Sitting Sitting   Pulse: 80    Resp: 18    Temp: 97.9 °F (36.6 °C)    TempSrc: Oral    SpO2: 97%    Weight: 53.1 kg (117 lb)    Height: 5' 8" (1.727 m)       Physical " Exam  Vitals and nursing note reviewed.   Constitutional:       General: She is not in acute distress.     Appearance: She is not ill-appearing, toxic-appearing or diaphoretic.   HENT:      Right Ear: External ear normal.      Left Ear: External ear normal.      Nose: Nose normal.      Mouth/Throat:      Mouth: Mucous membranes are moist.      Pharynx: Oropharynx is clear. No oropharyngeal exudate or posterior oropharyngeal erythema.   Eyes:      Conjunctiva/sclera: Conjunctivae normal.   Cardiovascular:      Rate and Rhythm: Normal rate and regular rhythm.      Heart sounds: Normal heart sounds.   Pulmonary:      Effort: Pulmonary effort is normal.      Breath sounds: Normal breath sounds.   Musculoskeletal:      Cervical back: Normal range of motion and neck supple.   Skin:     General: Skin is warm and dry.      Capillary Refill: Capillary refill takes less than 2 seconds.      Findings: No bruising.   Neurological:      Mental Status: She is alert and oriented to person, place, and time.   Psychiatric:         Mood and Affect: Mood normal.         Behavior: Behavior normal.        Assessment and Plan (including Health Maintenance)      Problem List  Smart Sets  Document Outside HM   :    Plan:     Patient Instructions   Pharmacologic therapy:  - Maalox or Mylanta prn   - carafate and viscous lidocaine ordered for symptomatic relief  - trial of H2 blocker  - continue PPI    Lifestyle modifications:  - Instructed to avoid provocative foods (dec ETOH, cafffeine, spicy foods, chocolate, fatty foods)  - tobacco cessation  - elevate head of bed 30 degrees    Lifestyle modifications for acid reflux:      Keep working on weight loss.    Elevation of the head of the bed may help if you're having nighttime symptoms (eg, cough, hoarseness, throat clearing). This can be achieved either by putting six- to eight-inch blocks under the legs at the head of the bed or a Styrofoam wedge under the mattress.     Try not to lie down  at least 30 minutes to an hour after a meal.    Don't eat anything two to three hours before bedtime.    Some common triggers include caffeine, chocolate, spicy foods, food with high fat content, carbonated beverages, and peppermint. Try to see if there is any connection with these foods and eliminate them from your diet.     Chewing sugar free gum might help with your symptoms as the increased salivary flow will help to neutralize the stomach acid.     Start headache diary  1. Keep a headache calendar. Include the time of onset, foods eaten within the past few hours, any smells or activities that could have attributed to the headache.      Pay close attention to foods containing artificial preservatives (nitrites), colorings (red dye), flavorings (MSG) as well as those containing caffeine and chocolate. Some aged cheeses have also been common triggers.  2. Increase your water intake. You need to drink at least 1.5 liters daily. If the drink has caffeine in it, this does not count toward the 1.5 liters since caffeine makes you urinate. Drink enough so that your urine is almost clear.  3. For an acute headache, take your medication at the very first sign of the headache. Take the medication with at least 6-8 ounces of a caffeinated  non diet beverage, in other words one with caffeine and sugar. Do not take pain medication more than 3 days a week as this can lead to rebound headaches.  4. Adequate sleep is a must for headache management. Lack of sleep is a big trigger for headaches. You should sleep at least 8 hours a night. Up to 10 hours is better.        5. Proper nutrition and eating at least 2 protein based meals a day is important for headache prevention. Breakfast must be eaten on a daily basis.   This does not  have to be a big meal, just something that contains protein so your body has ?fuel? for the day (cereal, peanut butter crackers, boiled egg, eggs, smoothie, etc.)     Headache:  Discussed expected  course of headache, considerations, risk factors and future prevention, Discussed treatment options for headache, Discussed use of pain relief medicine and alternatives, Discussed warning signs requiring additional medical attention for headache        Health Maintenance Due   Topic Date Due    Hepatitis C Screening  Never done    COVID-19 Vaccine (1) Never done    HIV Screening  Never done    TETANUS VACCINE  Never done    Mammogram  Never done    Shingles Vaccine (1 of 2) Never done    Colorectal Cancer Screening  09/09/2023       Problem List Items Addressed This Visit          Cardiac/Vascular    Hypertension - Primary    Current Assessment & Plan     Blood pressure improved continue current medication and monitor            Oncology    Anemia    Current Assessment & Plan     Encouraged iron supplement and follow up with hematology            GI    Gastroesophageal reflux disease without esophagitis    Current Assessment & Plan     Continue Pepcid discussed avoid BC powder take tylenol for pain to avoid GI           Other Visit Diagnoses       Acute non intractable tension-type headache              Hypertension, unspecified type    Anemia, unspecified type    Acute non intractable tension-type headache    Gastroesophageal reflux disease without esophagitis       Health Maintenance Topics with due status: Not Due       Topic Last Completion Date    Lipid Panel 06/07/2023    Influenza Vaccine Not Due         Future Appointments   Date Time Provider Department Center   8/21/2023 10:00 AM Ramila Rodriguez, [facility] [place]   8/28/2023  1:15 PM RESPIRATORY THERAPY, [facility]   8/28/2023  1:50 PM [facility]   9/13/2023  8:30 AM AWV NURSE, [facility]IC[facility]        Follow up in about 4 weeks (around 8/16/2023), or if symptoms worsen or fail to improve.    Health Maintenance Due   Topic Date  Due    Hepatitis C Screening  Never done    COVID-19 Vaccine (1) Never done    HIV Screening  Never done    TETANUS VACCINE  Never done    Mammogram  Never done    Shingles Vaccine (1 of 2) Never done    Colorectal Cancer Screening  09/09/2023        Signature:  LAYNE De La Torre    Date of encounter: 7/19/23

## 2023-07-25 DIAGNOSIS — I10 ESSENTIAL HYPERTENSION: ICD-10-CM

## 2023-07-25 RX ORDER — LOSARTAN POTASSIUM 50 MG/1
50 TABLET ORAL DAILY
Qty: 90 TABLET | Refills: 1 | Status: SHIPPED | OUTPATIENT
Start: 2023-07-25

## 2023-08-21 ENCOUNTER — OFFICE VISIT (OUTPATIENT)
Dept: FAMILY MEDICINE | Facility: CLINIC | Age: 63
End: 2023-08-21
Payer: MEDICARE

## 2023-08-21 VITALS
DIASTOLIC BLOOD PRESSURE: 82 MMHG | SYSTOLIC BLOOD PRESSURE: 137 MMHG | OXYGEN SATURATION: 97 % | BODY MASS INDEX: 18.09 KG/M2 | RESPIRATION RATE: 18 BRPM | HEART RATE: 71 BPM | WEIGHT: 119.38 LBS | TEMPERATURE: 98 F | HEIGHT: 68 IN

## 2023-08-21 DIAGNOSIS — M25.552 PAIN OF LEFT HIP: ICD-10-CM

## 2023-08-21 DIAGNOSIS — D64.9 ANEMIA, UNSPECIFIED TYPE: ICD-10-CM

## 2023-08-21 DIAGNOSIS — L60.2 HYPERTROPHIC TOENAIL: ICD-10-CM

## 2023-08-21 DIAGNOSIS — I10 HYPERTENSION, UNSPECIFIED TYPE: Primary | ICD-10-CM

## 2023-08-21 PROCEDURE — 3008F PR BODY MASS INDEX (BMI) DOCUMENTED: ICD-10-PCS | Mod: ,,, | Performed by: NURSE PRACTITIONER

## 2023-08-21 PROCEDURE — 99213 PR OFFICE/OUTPT VISIT, EST, LEVL III, 20-29 MIN: ICD-10-PCS | Mod: ,,, | Performed by: NURSE PRACTITIONER

## 2023-08-21 PROCEDURE — 3066F PR DOCUMENTATION OF TREATMENT FOR NEPHROPATHY: ICD-10-PCS | Mod: ,,, | Performed by: NURSE PRACTITIONER

## 2023-08-21 PROCEDURE — 4010F ACE/ARB THERAPY RXD/TAKEN: CPT | Mod: ,,, | Performed by: NURSE PRACTITIONER

## 2023-08-21 PROCEDURE — 3066F NEPHROPATHY DOC TX: CPT | Mod: ,,, | Performed by: NURSE PRACTITIONER

## 2023-08-21 PROCEDURE — 99213 OFFICE O/P EST LOW 20 MIN: CPT | Mod: ,,, | Performed by: NURSE PRACTITIONER

## 2023-08-21 PROCEDURE — 3079F DIAST BP 80-89 MM HG: CPT | Mod: ,,, | Performed by: NURSE PRACTITIONER

## 2023-08-21 PROCEDURE — 3008F BODY MASS INDEX DOCD: CPT | Mod: ,,, | Performed by: NURSE PRACTITIONER

## 2023-08-21 PROCEDURE — 4010F PR ACE/ARB THEARPY RXD/TAKEN: ICD-10-PCS | Mod: ,,, | Performed by: NURSE PRACTITIONER

## 2023-08-21 PROCEDURE — 3079F PR MOST RECENT DIASTOLIC BLOOD PRESSURE 80-89 MM HG: ICD-10-PCS | Mod: ,,, | Performed by: NURSE PRACTITIONER

## 2023-08-21 PROCEDURE — 1159F PR MEDICATION LIST DOCUMENTED IN MEDICAL RECORD: ICD-10-PCS | Mod: ,,, | Performed by: NURSE PRACTITIONER

## 2023-08-21 PROCEDURE — 1160F PR REVIEW ALL MEDS BY PRESCRIBER/CLIN PHARMACIST DOCUMENTED: ICD-10-PCS | Mod: ,,, | Performed by: NURSE PRACTITIONER

## 2023-08-21 PROCEDURE — 1159F MED LIST DOCD IN RCRD: CPT | Mod: ,,, | Performed by: NURSE PRACTITIONER

## 2023-08-21 PROCEDURE — 3075F PR MOST RECENT SYSTOLIC BLOOD PRESS GE 130-139MM HG: ICD-10-PCS | Mod: ,,, | Performed by: NURSE PRACTITIONER

## 2023-08-21 PROCEDURE — 3061F PR NEG MICROALBUMINURIA RESULT DOCUMENTED/REVIEW: ICD-10-PCS | Mod: ,,, | Performed by: NURSE PRACTITIONER

## 2023-08-21 PROCEDURE — 3075F SYST BP GE 130 - 139MM HG: CPT | Mod: ,,, | Performed by: NURSE PRACTITIONER

## 2023-08-21 PROCEDURE — 3061F NEG MICROALBUMINURIA REV: CPT | Mod: ,,, | Performed by: NURSE PRACTITIONER

## 2023-08-21 PROCEDURE — 1160F RVW MEDS BY RX/DR IN RCRD: CPT | Mod: ,,, | Performed by: NURSE PRACTITIONER

## 2023-08-21 RX ORDER — CICLOPIROX 80 MG/ML
SOLUTION TOPICAL NIGHTLY
Qty: 6.6 ML | Refills: 5 | Status: SHIPPED | OUTPATIENT
Start: 2023-08-21 | End: 2023-10-13

## 2023-08-21 RX ORDER — DICLOFENAC SODIUM 10 MG/G
2 GEL TOPICAL 4 TIMES DAILY
Qty: 450 G | Refills: 0 | Status: SHIPPED | OUTPATIENT
Start: 2023-08-21 | End: 2023-09-13

## 2023-08-21 NOTE — PATIENT INSTRUCTIONS
No bending, pushing, pulling, straining or any activities that could cause more injury.  Rotate heat and ice to area 20 minutes every 2 hours when possible.  Rest as much as possible.  Tylenol 1-2 tablets every 4-6 hours as needed for pain. Again avoid aspirin products discussed. Ok to also take aleve twice daily with food.  Return to clinic for any new or worsening symtpoms.

## 2023-08-21 NOTE — PROGRESS NOTES
LAYNE De La Torre   [facility], [place]  [address]  [place] [address]     PATIENT NAME: Arlette Cisneros  : 1960  DATE: 23  MRN: [id number]      Billing Provider: LAYNE De La Torre  Level of Service:   Patient PCP Information       Provider PCP Type    Sheba Grant NP General            Reason for Visit / Chief Complaint: Hypertension   Health Maintenance Due   Topic Date Due    Hepatitis C Screening  Never done    COVID-19 Vaccine (1) Never done    HIV Screening  Never done    TETANUS VACCINE  Never done    Mammogram  Never done    Shingles Vaccine (1 of 2) Never done    Colorectal Cancer Screening  2023          Update PCP  Update Chief Complaint         History of Present Illness / Problem Focused Workflow     Arlette Cisneros presents to the clinic follow up hypertension and anemia, she is having low back left hip pain today - no known injury. Report it comes and goes. She will see hematology .     Hemoglobin A1C   Date Value Ref Range Status   2022 5.9 4.5 - 6.6 % Final     Comment:       Normal:               <5.7%  Pre-Diabetic:       5.7% to 6.4%  Diabetic:             >6.4%  Diabetic Goal:     <7%        CMP  Sodium   Date Value Ref Range Status   2023 137 136 - 145 mmol/L Final     Potassium   Date Value Ref Range Status   2023 3.9 3.5 - 5.1 mmol/L Final     Chloride   Date Value Ref Range Status   2023 109 (H) 98 - 107 mmol/L Final     CO2   Date Value Ref Range Status   2023 24 21 - 32 mmol/L Final     Glucose   Date Value Ref Range Status   2023 100 74 - 106 mg/dL Final     BUN   Date Value Ref Range Status   2023 12 7 - 18 mg/dL Final     Creatinine   Date Value Ref Range Status   2023 0.70 0.55 - 1.02 mg/dL Final     Calcium   Date Value Ref Range Status   2023 8.8 8.5 - 10.1 mg/dL Final     Total Protein   Date Value Ref Range Status   2023 7.6 6.4 - 8.2 g/dL Final     Albumin    Date Value Ref Range Status   06/07/2023 3.7 3.5 - 5.0 g/dL Final     Bilirubin, Total   Date Value Ref Range Status   06/07/2023 0.3 >0.0 - 1.2 mg/dL Final     Alk Phos   Date Value Ref Range Status   06/07/2023 78 50 - 130 U/L Final     AST   Date Value Ref Range Status   06/07/2023 24 15 - 37 U/L Final     ALT   Date Value Ref Range Status   06/07/2023 23 13 - 56 U/L Final     Anion Gap   Date Value Ref Range Status   06/07/2023 8 7 - 16 mmol/L Final     eGFR   Date Value Ref Range Status   06/07/2023 97 >=60 mL/min/1.73m2 Final        Lab Results   Component Value Date    WBC 4.36 (L) 07/05/2023    RBC 5.08 07/05/2023    HGB 11.1 (L) 07/05/2023    HCT 38.6 07/05/2023    MCV 76.0 (L) 07/05/2023    MCH 21.9 (L) 07/05/2023    MCHC 28.8 (L) 07/05/2023    RDW 28.1 (H) 07/05/2023     07/05/2023    MPV 11.2 06/07/2023    LYMPH 22.5 (L) 07/05/2023    LYMPH 0.98 (L) 07/05/2023    MONO 8.5 (H) 07/05/2023    EOS 0.10 07/05/2023    BASO 0.05 07/05/2023    EOSINOPHIL 2.3 07/05/2023    BASOPHIL 1.1 (H) 07/05/2023        Lab Results   Component Value Date    CHOL 174 06/07/2023    CHOL 198 08/31/2022    CHOL 205 (H) 12/14/2021     Lab Results   Component Value Date    HDL 55 06/07/2023    HDL 62 (H) 08/31/2022    HDL 55 12/14/2021     Lab Results   Component Value Date    LDLCALC 93 06/07/2023    LDLCALC 109 08/31/2022    LDLCALC 126 12/14/2021     Lab Results   Component Value Date    TRIG 128 06/07/2023    TRIG 133 08/31/2022    TRIG 121 12/14/2021     Lab Results   Component Value Date    CHOLHDL 3.2 06/07/2023    CHOLHDL 3.2 08/31/2022    CHOLHDL 3.7 12/14/2021        Wt Readings from Last 3 Encounters:   08/21/23 0959 54.2 kg (119 lb 6.4 oz)   07/19/23 1353 53.1 kg (117 lb)   07/12/23 1452 55.8 kg (123 lb)        BP Readings from Last 3 Encounters:   08/21/23 137/82   07/19/23 131/80   07/12/23 130/88        Review of Systems     Review of Systems   Constitutional:  Positive for fatigue. Negative for appetite  change.   HENT:  Negative for mouth sores.    Respiratory:  Positive for shortness of breath. Negative for chest tightness.    Cardiovascular:  Negative for chest pain and palpitations.   Gastrointestinal:  Positive for nausea and vomiting. Negative for abdominal pain and blood in stool.   Genitourinary:  Negative for difficulty urinating.   Musculoskeletal:  Positive for arthralgias, back pain, leg pain and myalgias.   Neurological:  Positive for weakness, numbness and headaches. Negative for dizziness and syncope.   Hematological:  Negative for adenopathy. Does not bruise/bleed easily.        Medical / Social / Family History     Past Medical History:   Diagnosis Date    Anemia     Cervical radiculopathy 12/23/2018    Degenerative joint disease involving multiple joints 12/21/2020    Hypercholesteremia 03/23/2021    Hypertension     Restrictive lung disease 03/23/2021    Temporomandibular joint disorder 04/30/2018       Past Surgical History:   Procedure Laterality Date    APPENDECTOMY      HYSTERECTOMY         Social History  Ms.  reports that she has never smoked. She has never been exposed to tobacco smoke. She has never used smokeless tobacco. She reports that she does not currently use drugs. She reports that she does not drink alcohol.    Family History  Ms.'s family history includes Cancer in her father; Heart disease in her mother; Kidney disease in her father; Stroke in her mother.    Medications and Allergies     Medications  Outpatient Medications Marked as Taking for the 8/21/23 encounter (Office Visit) with Ramila Rodriguez FNP   Medication Sig Dispense Refill    albuterol (VENTOLIN HFA) 90 mcg/actuation inhaler Inhale 2 puffs into the lungs every 6 (six) hours as needed for Wheezing. Rescue 18 g 5    amLODIPine (NORVASC) 5 MG tablet Take 1 tablet (5 mg total) by mouth once daily. 30 tablet 2    famotidine (PEPCID) 40 MG tablet Take 1 tablet (40 mg total) by mouth once daily. 30 tablet 2     "fluticasone-umeclidin-vilanter (TRELEGY ELLIPTA) 100-62.5-25 mcg DsDv Inhale 1 puff into the lungs once daily.      losartan (COZAAR) 50 MG tablet Take 1 tablet (50 mg total) by mouth once daily. 90 tablet 1    omeprazole (PRILOSEC) 20 MG capsule TAKE ONE CAPSULE BY MOUTH TWICE DAILY FOR TWO WEEKS THEN MAY decrease TO ONCE DAILY. 90 capsule 1       Allergies  Review of patient's allergies indicates:   Allergen Reactions    Codeine sulfate Shortness Of Breath       Physical Examination     Vitals:    08/21/23 0959 08/21/23 1001 08/21/23 1031   BP: (!) 177/96  Comment: just took medication (!) 165/92 137/82   BP Location: Left arm Right arm Left arm   Patient Position: Sitting     Pulse: 71     Resp: 18     Temp: 97.5 °F (36.4 °C)     TempSrc: Oral     SpO2: 97%     Weight: 54.2 kg (119 lb 6.4 oz)     Height: 5' 8" (1.727 m)        Physical Exam  Vitals and nursing note reviewed.   Constitutional:       General: She is not in acute distress.     Appearance: She is not ill-appearing, toxic-appearing or diaphoretic.   HENT:      Right Ear: External ear normal.      Left Ear: External ear normal.      Nose: Nose normal.      Mouth/Throat:      Mouth: Mucous membranes are moist.      Pharynx: Oropharynx is clear. No oropharyngeal exudate or posterior oropharyngeal erythema.   Eyes:      Conjunctiva/sclera: Conjunctivae normal.   Cardiovascular:      Rate and Rhythm: Normal rate and regular rhythm.      Heart sounds: Normal heart sounds.   Pulmonary:      Effort: Pulmonary effort is normal.      Breath sounds: Normal breath sounds.   Musculoskeletal:      Cervical back: Normal range of motion and neck supple.      Lumbar back: Tenderness and bony tenderness present. Normal range of motion.      Right lower leg: No edema.      Left lower leg: No edema.      Comments: Dorsiflexion great toes normal   Feet:      Right foot:      Toenail Condition: Right toenails are abnormally thick.      Left foot:      Toenail Condition: " Left toenails are abnormally thick.   Skin:     General: Skin is warm and dry.      Capillary Refill: Capillary refill takes less than 2 seconds.      Findings: No bruising.   Neurological:      Mental Status: She is alert and oriented to person, place, and time.   Psychiatric:         Mood and Affect: Mood normal.         Behavior: Behavior normal.          Assessment and Plan (including Health Maintenance)      Problem List  Smart Sets  Document Outside HM   :    Plan:     Patient Instructions     No bending, pushing, pulling, straining or any activities that could cause more injury.  Rotate heat and ice to area 20 minutes every 2 hours when possible.  Rest as much as possible.  Tylenol 1-2 tablets every 4-6 hours as needed for pain. Again avoid aspirin products discussed. Ok to also take aleve twice daily with food.  Return to clinic for any new or worsening symtpoms.         Health Maintenance Due   Topic Date Due    Hepatitis C Screening  Never done    COVID-19 Vaccine (1) Never done    HIV Screening  Never done    TETANUS VACCINE  Never done    Mammogram  Never done    Shingles Vaccine (1 of 2) Never done    Colorectal Cancer Screening  09/09/2023       Problem List Items Addressed This Visit          Cardiac/Vascular    Hypertension - Primary       Oncology    Anemia    Relevant Orders    CBC Auto Differential     Other Visit Diagnoses       Hypertrophic toenail        Relevant Medications    ciclopirox (PENLAC) 8 % Soln    Pain of left hip        Relevant Medications    diclofenac sodium (VOLTAREN) 1 % Gel          Hypertension, unspecified type    Anemia, unspecified type  -     CBC Auto Differential; Future; Expected date: 08/21/2023    Hypertrophic toenail  -     ciclopirox (PENLAC) 8 % Soln; Apply topically nightly.  Dispense: 6.6 mL; Refill: 5    Pain of left hip  -     diclofenac sodium (VOLTAREN) 1 % Gel; Apply 2 g topically 4 (four) times daily.  Dispense: 450 g; Refill: 0       Health Maintenance  Topics with due status: Not Due       Topic Last Completion Date    Lipid Panel 06/07/2023    Influenza Vaccine Not Due         Future Appointments   Date Time Provider Department Center   9/13/2023  9:00 AM [facility] [place]   9/20/2023  1:15 PM RESPIRATORY THERAPY, [facility]    9/20/2023  1:50 PM [facility]        No follow-ups on file.    Health Maintenance Due   Topic Date Due    Hepatitis C Screening  Never done    COVID-19 Vaccine (1) Never done    HIV Screening  Never done    TETANUS VACCINE  Never done    Mammogram  Never done    Shingles Vaccine (1 of 2) Never done    Colorectal Cancer Screening  09/09/2023        Signature:  LAYNE De La Torre    Date of encounter: 8/21/23

## 2023-09-13 ENCOUNTER — OFFICE VISIT (OUTPATIENT)
Dept: FAMILY MEDICINE | Facility: CLINIC | Age: 63
End: 2023-09-13
Payer: MEDICARE

## 2023-09-13 VITALS
RESPIRATION RATE: 18 BRPM | DIASTOLIC BLOOD PRESSURE: 84 MMHG | HEIGHT: 68 IN | OXYGEN SATURATION: 97 % | HEART RATE: 75 BPM | SYSTOLIC BLOOD PRESSURE: 130 MMHG | TEMPERATURE: 98 F | BODY MASS INDEX: 17.64 KG/M2 | WEIGHT: 116.38 LBS

## 2023-09-13 DIAGNOSIS — K63.5 POLYP OF COLON, UNSPECIFIED PART OF COLON, UNSPECIFIED TYPE: Primary | ICD-10-CM

## 2023-09-13 DIAGNOSIS — I10 ESSENTIAL HYPERTENSION: ICD-10-CM

## 2023-09-13 DIAGNOSIS — I10 HYPERTENSION, UNSPECIFIED TYPE: ICD-10-CM

## 2023-09-13 DIAGNOSIS — K21.9 GASTROESOPHAGEAL REFLUX DISEASE WITHOUT ESOPHAGITIS: ICD-10-CM

## 2023-09-13 DIAGNOSIS — R06.02 SOB (SHORTNESS OF BREATH): ICD-10-CM

## 2023-09-13 LAB
ALBUMIN SERPL BCP-MCNC: 3.7 G/DL (ref 3.5–5)
ALBUMIN/GLOB SERPL: 1 {RATIO}
ALP SERPL-CCNC: 103 U/L (ref 50–130)
ALT SERPL W P-5'-P-CCNC: 43 U/L (ref 13–56)
ANION GAP SERPL CALCULATED.3IONS-SCNC: 12 MMOL/L (ref 7–16)
AST SERPL W P-5'-P-CCNC: 34 U/L (ref 15–37)
BASOPHILS # BLD AUTO: 0.05 K/UL (ref 0–0.2)
BASOPHILS NFR BLD AUTO: 1.5 % (ref 0–1)
BILIRUB SERPL-MCNC: 0.4 MG/DL (ref ?–1.2)
BUN SERPL-MCNC: 16 MG/DL (ref 7–18)
BUN/CREAT SERPL: 23 (ref 6–20)
CALCIUM SERPL-MCNC: 9.2 MG/DL (ref 8.5–10.1)
CHLORIDE SERPL-SCNC: 109 MMOL/L (ref 98–107)
CHOLEST SERPL-MCNC: 271 MG/DL (ref 0–200)
CHOLEST/HDLC SERPL: 5 {RATIO}
CO2 SERPL-SCNC: 25 MMOL/L (ref 21–32)
CREAT SERPL-MCNC: 0.71 MG/DL (ref 0.55–1.02)
CREAT UR-MCNC: 249 MG/DL (ref 28–219)
DIFFERENTIAL METHOD BLD: ABNORMAL
EGFR (NO RACE VARIABLE) (RUSH/TITUS): 95 ML/MIN/1.73M2
EOSINOPHIL # BLD AUTO: 0.11 K/UL (ref 0–0.5)
EOSINOPHIL NFR BLD AUTO: 3.3 % (ref 1–4)
ERYTHROCYTE [DISTWIDTH] IN BLOOD BY AUTOMATED COUNT: 17.3 % (ref 11.5–14.5)
GLOBULIN SER-MCNC: 3.8 G/DL (ref 2–4)
GLUCOSE SERPL-MCNC: 114 MG/DL (ref 74–106)
HCT VFR BLD AUTO: 38.7 % (ref 38–47)
HDLC SERPL-MCNC: 54 MG/DL (ref 40–60)
HGB BLD-MCNC: 12.1 G/DL (ref 12–16)
IMM GRANULOCYTES # BLD AUTO: 0.01 K/UL (ref 0–0.04)
IMM GRANULOCYTES NFR BLD: 0.3 % (ref 0–0.4)
LDLC SERPL CALC-MCNC: 184 MG/DL
LDLC/HDLC SERPL: 3.4 {RATIO}
LYMPHOCYTES # BLD AUTO: 0.99 K/UL (ref 1–4.8)
LYMPHOCYTES NFR BLD AUTO: 29.9 % (ref 27–41)
MCH RBC QN AUTO: 26.2 PG (ref 27–31)
MCHC RBC AUTO-ENTMCNC: 31.3 G/DL (ref 32–36)
MCV RBC AUTO: 83.8 FL (ref 80–96)
MICROALBUMIN UR-MCNC: 4.8 MG/DL (ref 0–2.8)
MICROALBUMIN/CREAT RATIO PNL UR: 19.3 MG/G (ref 0–30)
MONOCYTES # BLD AUTO: 0.44 K/UL (ref 0–0.8)
MONOCYTES NFR BLD AUTO: 13.3 % (ref 2–6)
MPC BLD CALC-MCNC: 11.7 FL (ref 9.4–12.4)
NEUTROPHILS # BLD AUTO: 1.71 K/UL (ref 1.8–7.7)
NEUTROPHILS NFR BLD AUTO: 51.7 % (ref 53–65)
NONHDLC SERPL-MCNC: 217 MG/DL
NRBC # BLD AUTO: 0 X10E3/UL
NRBC, AUTO (.00): 0 %
PLATELET # BLD AUTO: 289 K/UL (ref 150–400)
POTASSIUM SERPL-SCNC: 3.7 MMOL/L (ref 3.5–5.1)
PROT SERPL-MCNC: 7.5 G/DL (ref 6.4–8.2)
RBC # BLD AUTO: 4.62 M/UL (ref 4.2–5.4)
SODIUM SERPL-SCNC: 142 MMOL/L (ref 136–145)
TRIGL SERPL-MCNC: 165 MG/DL (ref 35–150)
VLDLC SERPL-MCNC: 33 MG/DL
WBC # BLD AUTO: 3.31 K/UL (ref 4.5–11)

## 2023-09-13 PROCEDURE — 4010F PR ACE/ARB THEARPY RXD/TAKEN: ICD-10-PCS | Mod: ,,,

## 2023-09-13 PROCEDURE — 82043 UR ALBUMIN QUANTITATIVE: CPT | Mod: ,,, | Performed by: CLINICAL MEDICAL LABORATORY

## 2023-09-13 PROCEDURE — 1159F MED LIST DOCD IN RCRD: CPT | Mod: ,,,

## 2023-09-13 PROCEDURE — 82043 MICROALBUMIN / CREATININE RATIO URINE: ICD-10-PCS | Mod: ,,, | Performed by: CLINICAL MEDICAL LABORATORY

## 2023-09-13 PROCEDURE — 3075F PR MOST RECENT SYSTOLIC BLOOD PRESS GE 130-139MM HG: ICD-10-PCS | Mod: ,,,

## 2023-09-13 PROCEDURE — 3079F DIAST BP 80-89 MM HG: CPT | Mod: ,,,

## 2023-09-13 PROCEDURE — 3079F PR MOST RECENT DIASTOLIC BLOOD PRESSURE 80-89 MM HG: ICD-10-PCS | Mod: ,,,

## 2023-09-13 PROCEDURE — 99214 PR OFFICE/OUTPT VISIT, EST, LEVL IV, 30-39 MIN: ICD-10-PCS | Mod: ,,,

## 2023-09-13 PROCEDURE — 3061F NEG MICROALBUMINURIA REV: CPT | Mod: ,,,

## 2023-09-13 PROCEDURE — 80053 COMPREHEN METABOLIC PANEL: CPT | Mod: ,,, | Performed by: CLINICAL MEDICAL LABORATORY

## 2023-09-13 PROCEDURE — 3008F BODY MASS INDEX DOCD: CPT | Mod: ,,,

## 2023-09-13 PROCEDURE — 3075F SYST BP GE 130 - 139MM HG: CPT | Mod: ,,,

## 2023-09-13 PROCEDURE — 3061F PR NEG MICROALBUMINURIA RESULT DOCUMENTED/REVIEW: ICD-10-PCS | Mod: ,,,

## 2023-09-13 PROCEDURE — 1160F RVW MEDS BY RX/DR IN RCRD: CPT | Mod: ,,,

## 2023-09-13 PROCEDURE — 4010F ACE/ARB THERAPY RXD/TAKEN: CPT | Mod: ,,,

## 2023-09-13 PROCEDURE — 3066F PR DOCUMENTATION OF TREATMENT FOR NEPHROPATHY: ICD-10-PCS | Mod: ,,,

## 2023-09-13 PROCEDURE — 80053 COMPREHENSIVE METABOLIC PANEL: ICD-10-PCS | Mod: ,,, | Performed by: CLINICAL MEDICAL LABORATORY

## 2023-09-13 PROCEDURE — 3066F NEPHROPATHY DOC TX: CPT | Mod: ,,,

## 2023-09-13 PROCEDURE — 80061 LIPID PANEL: ICD-10-PCS | Mod: ,,, | Performed by: CLINICAL MEDICAL LABORATORY

## 2023-09-13 PROCEDURE — 99214 OFFICE O/P EST MOD 30 MIN: CPT | Mod: ,,,

## 2023-09-13 PROCEDURE — 85025 CBC WITH DIFFERENTIAL: ICD-10-PCS | Mod: ,,, | Performed by: CLINICAL MEDICAL LABORATORY

## 2023-09-13 PROCEDURE — 85025 COMPLETE CBC W/AUTO DIFF WBC: CPT | Mod: ,,, | Performed by: CLINICAL MEDICAL LABORATORY

## 2023-09-13 PROCEDURE — 82570 MICROALBUMIN / CREATININE RATIO URINE: ICD-10-PCS | Mod: ,,, | Performed by: CLINICAL MEDICAL LABORATORY

## 2023-09-13 PROCEDURE — 1159F PR MEDICATION LIST DOCUMENTED IN MEDICAL RECORD: ICD-10-PCS | Mod: ,,,

## 2023-09-13 PROCEDURE — 80061 LIPID PANEL: CPT | Mod: ,,, | Performed by: CLINICAL MEDICAL LABORATORY

## 2023-09-13 PROCEDURE — 3008F PR BODY MASS INDEX (BMI) DOCUMENTED: ICD-10-PCS | Mod: ,,,

## 2023-09-13 PROCEDURE — 82570 ASSAY OF URINE CREATININE: CPT | Mod: ,,, | Performed by: CLINICAL MEDICAL LABORATORY

## 2023-09-13 PROCEDURE — 1160F PR REVIEW ALL MEDS BY PRESCRIBER/CLIN PHARMACIST DOCUMENTED: ICD-10-PCS | Mod: ,,,

## 2023-09-13 RX ORDER — LOSARTAN POTASSIUM 50 MG/1
50 TABLET ORAL DAILY
Qty: 90 TABLET | Refills: 1 | Status: CANCELLED | OUTPATIENT
Start: 2023-09-13

## 2023-09-13 RX ORDER — AMLODIPINE BESYLATE 5 MG/1
5 TABLET ORAL DAILY
Qty: 90 TABLET | Refills: 1 | Status: CANCELLED | OUTPATIENT
Start: 2023-09-13

## 2023-09-13 RX ORDER — OMEPRAZOLE 20 MG/1
CAPSULE, DELAYED RELEASE ORAL
Qty: 90 CAPSULE | Refills: 1 | Status: SHIPPED | OUTPATIENT
Start: 2023-09-13

## 2023-09-13 RX ORDER — ALBUTEROL SULFATE 90 UG/1
2 AEROSOL, METERED RESPIRATORY (INHALATION) EVERY 6 HOURS PRN
Qty: 18 G | Refills: 5 | Status: CANCELLED | OUTPATIENT
Start: 2023-09-13

## 2023-09-13 RX ORDER — FAMOTIDINE 40 MG/1
40 TABLET, FILM COATED ORAL DAILY
Qty: 30 TABLET | Refills: 2 | Status: SHIPPED | OUTPATIENT
Start: 2023-09-13 | End: 2024-02-26

## 2023-09-13 NOTE — PROGRESS NOTES
Subjective     Patient ID: Arlette Cisneros is a 62 y.o. female.    Chief Complaint: Follow-up (Needs referral to [facility])    Pt here today for med refill and lab work. Needs handicap parking renewed    Follow-up  This is a chronic problem. The current episode started more than 1 year ago. The problem occurs constantly. The problem has been unchanged. Associated symptoms include fatigue. Pertinent negatives include no abdominal pain, change in bowel habit, chest pain, chills, congestion, coughing, fever, headaches, nausea, rash, vertigo, visual change, vomiting or weakness. Associated symptoms comments: Pt is having iron infusions with hematology. Nothing aggravates the symptoms. She has tried nothing for the symptoms. The treatment provided moderate relief.     Review of Systems   Constitutional:  Positive for fatigue. Negative for chills and fever.   HENT:  Negative for nasal congestion, ear pain and hearing loss.    Eyes:  Negative for discharge and itching.   Respiratory:  Negative for cough, shortness of breath and wheezing.    Cardiovascular:  Negative for chest pain, palpitations, leg swelling and claudication.   Gastrointestinal:  Negative for abdominal pain, blood in stool, change in bowel habit, nausea, vomiting and change in bowel habit.   Genitourinary:  Negative for decreased urine volume and difficulty urinating.   Integumentary:  Negative for rash.   Neurological:  Negative for dizziness, vertigo, seizures, syncope, weakness and headaches.   Psychiatric/Behavioral:  Negative for decreased concentration.           Objective     Physical Exam  Vitals and nursing note reviewed.   Constitutional:       Appearance: Normal appearance. She is not ill-appearing.   HENT:      Head: Normocephalic.      Right Ear: Tympanic membrane, ear canal and external ear normal.      Left Ear: Tympanic membrane, ear canal and external ear normal.      Nose: Nose normal.      Mouth/Throat:      Mouth: Mucous membranes are  moist.      Pharynx: Oropharynx is clear.   Eyes:      General: No scleral icterus.     Extraocular Movements: Extraocular movements intact.      Conjunctiva/sclera: Conjunctivae normal.      Pupils: Pupils are equal, round, and reactive to light.   Neck:      Vascular: No carotid bruit.   Cardiovascular:      Rate and Rhythm: Normal rate and regular rhythm.      Pulses: Normal pulses.      Heart sounds: Normal heart sounds.   Pulmonary:      Effort: Pulmonary effort is normal. No respiratory distress.      Breath sounds: Normal breath sounds.      Comments: See dr. 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 She will have more test to be definitive of these dx   Abdominal:      General: Bowel sounds are normal.      Palpations: Abdomen is soft.      Tenderness: There is no abdominal tenderness.   Musculoskeletal:         General: Normal range of motion.      Cervical back: Normal range of motion and neck supple. No tenderness.   Skin:     General: Skin is warm and dry.      Capillary Refill: Capillary refill takes less than 2 seconds.   Neurological:      General: No focal deficit present.      Mental Status: She is alert and oriented to person, place, and time.   Psychiatric:         Mood and Affect: Mood normal.         Behavior: Behavior normal.         Thought Content: Thought content normal.         Judgment: Judgment normal.          Assessment and Plan     1. Polyp of colon, unspecified part of colon, unspecified type  Assessment & Plan:  Referral to GI for c-scope     Orders:  -     Ambulatory referral/consult to Gastroenterology; Future; Expected date: 09/20/2023    2. SOB (shortness of breath)  Assessment & Plan:  Current medication regimen has symptoms under control. Will see Dr. Vasquez in the next week for fu for lung issues      3. Hypertension, unspecified type  Assessment & Plan:  Blood pressure is controlled. Current medical regimen is effective;    Will adjust according to results and monitor.      4. Essential hypertension  Assessment & Plan:  Blood pressure is controlled. Current medical regimen is effective;   Will adjust according to results and monitor.    Orders:  -     CBC Auto Differential; Future; Expected date: 09/13/2023  -     Comprehensive Metabolic Panel; Future; Expected date: 09/13/2023  -     Lipid Panel; Future; Expected date: 09/13/2023  -     Microalbumin/Creatinine Ratio, Urine    5. Gastroesophageal reflux disease without esophagitis  Assessment & Plan:  Symptoms under control with current regimen     Orders:  -     omeprazole (PRILOSEC) 20 MG capsule; Take 1 daily  Dispense: 90 capsule; Refill: 1  -     famotidine (PEPCID) 40 MG tablet; Take 1 tablet (40 mg total) by mouth once daily.  Dispense: 30 tablet; Refill: 2  -     CBC Auto Differential; Future; Expected date: 09/13/2023  -     Comprehensive Metabolic Panel; Future; Expected date: 09/13/2023  -     Lipid Panel; Future; Expected date: 09/13/2023  -     Microalbumin/Creatinine Ratio, Urine             Follow up in about 3 months (around 12/13/2023).

## 2023-09-13 NOTE — ASSESSMENT & PLAN NOTE
Current medication regimen has symptoms under control. Will see Dr. Vasquez in the next week for fu for lung issues

## 2023-09-14 NOTE — PROGRESS NOTES
Microalbumin is elevated. Make sure she is drinking plenty of water   Glucose is 114 a little elevated. Make sure she is fasting for lab in future if she was not  Was she fasting?   Triglycerides 165  Cholesterol 271  Low fat, watch intake of fried greasy foods and saturated fatty food  Cbc H&H is holding and is normal

## 2023-09-20 ENCOUNTER — HOSPITAL ENCOUNTER (OUTPATIENT)
Dept: RADIOLOGY | Facility: HOSPITAL | Age: 63
Discharge: HOME | End: 2023-09-20
Attending: INTERNAL MEDICINE
Payer: MEDICARE

## 2023-09-20 ENCOUNTER — OFFICE VISIT (OUTPATIENT)
Dept: PULMONOLOGY | Facility: CLINIC | Age: 63
End: 2023-09-20
Payer: MEDICARE

## 2023-09-20 ENCOUNTER — CLINICAL SUPPORT (OUTPATIENT)
Dept: PULMONOLOGY | Facility: HOSPITAL | Age: 63
End: 2023-09-20
Attending: INTERNAL MEDICINE
Payer: MEDICARE

## 2023-09-20 VITALS
DIASTOLIC BLOOD PRESSURE: 80 MMHG | OXYGEN SATURATION: 97 % | HEART RATE: 85 BPM | WEIGHT: 116 LBS | HEIGHT: 68 IN | BODY MASS INDEX: 17.58 KG/M2 | SYSTOLIC BLOOD PRESSURE: 168 MMHG

## 2023-09-20 DIAGNOSIS — R06.02 SOB (SHORTNESS OF BREATH): ICD-10-CM

## 2023-09-20 PROCEDURE — 3008F BODY MASS INDEX DOCD: CPT | Mod: CPTII,,, | Performed by: INTERNAL MEDICINE

## 2023-09-20 PROCEDURE — 99213 OFFICE O/P EST LOW 20 MIN: CPT | Mod: S$PBB,25,, | Performed by: INTERNAL MEDICINE

## 2023-09-20 PROCEDURE — 94729 PR C02/MEMBANE DIFFUSE CAPACITY: ICD-10-PCS | Mod: 26,,, | Performed by: INTERNAL MEDICINE

## 2023-09-20 PROCEDURE — 3077F SYST BP >= 140 MM HG: CPT | Mod: CPTII,,, | Performed by: INTERNAL MEDICINE

## 2023-09-20 PROCEDURE — 1159F MED LIST DOCD IN RCRD: CPT | Mod: CPTII,,, | Performed by: INTERNAL MEDICINE

## 2023-09-20 PROCEDURE — 94060 PR EVAL OF BRONCHOSPASM: ICD-10-PCS | Mod: 26,,, | Performed by: INTERNAL MEDICINE

## 2023-09-20 PROCEDURE — 99214 OFFICE O/P EST MOD 30 MIN: CPT | Mod: PBBFAC,25 | Performed by: INTERNAL MEDICINE

## 2023-09-20 PROCEDURE — 3079F DIAST BP 80-89 MM HG: CPT | Mod: CPTII,,, | Performed by: INTERNAL MEDICINE

## 2023-09-20 PROCEDURE — 3008F PR BODY MASS INDEX (BMI) DOCUMENTED: ICD-10-PCS | Mod: CPTII,,, | Performed by: INTERNAL MEDICINE

## 2023-09-20 PROCEDURE — 4010F PR ACE/ARB THEARPY RXD/TAKEN: ICD-10-PCS | Mod: CPTII,,, | Performed by: INTERNAL MEDICINE

## 2023-09-20 PROCEDURE — 94729 DIFFUSING CAPACITY: CPT | Mod: 26,,, | Performed by: INTERNAL MEDICINE

## 2023-09-20 PROCEDURE — 1159F PR MEDICATION LIST DOCUMENTED IN MEDICAL RECORD: ICD-10-PCS | Mod: CPTII,,, | Performed by: INTERNAL MEDICINE

## 2023-09-20 PROCEDURE — 94060 EVALUATION OF WHEEZING: CPT | Mod: 26,,, | Performed by: INTERNAL MEDICINE

## 2023-09-20 PROCEDURE — 94729 DIFFUSING CAPACITY: CPT

## 2023-09-20 PROCEDURE — 71046 XR CHEST PA AND LATERAL: ICD-10-PCS | Mod: 26,,, | Performed by: RADIOLOGY

## 2023-09-20 PROCEDURE — 3079F PR MOST RECENT DIASTOLIC BLOOD PRESSURE 80-89 MM HG: ICD-10-PCS | Mod: CPTII,,, | Performed by: INTERNAL MEDICINE

## 2023-09-20 PROCEDURE — 3061F NEG MICROALBUMINURIA REV: CPT | Mod: CPTII,,, | Performed by: INTERNAL MEDICINE

## 2023-09-20 PROCEDURE — 71046 X-RAY EXAM CHEST 2 VIEWS: CPT | Mod: 26,,, | Performed by: RADIOLOGY

## 2023-09-20 PROCEDURE — 27100098 HC SPACER

## 2023-09-20 PROCEDURE — 94060 EVALUATION OF WHEEZING: CPT

## 2023-09-20 PROCEDURE — 71046 X-RAY EXAM CHEST 2 VIEWS: CPT | Mod: TC

## 2023-09-20 PROCEDURE — 3061F PR NEG MICROALBUMINURIA RESULT DOCUMENTED/REVIEW: ICD-10-PCS | Mod: CPTII,,, | Performed by: INTERNAL MEDICINE

## 2023-09-20 PROCEDURE — 94726 PLETHYSMOGRAPHY LUNG VOLUMES: CPT

## 2023-09-20 PROCEDURE — 3077F PR MOST RECENT SYSTOLIC BLOOD PRESSURE >= 140 MM HG: ICD-10-PCS | Mod: CPTII,,, | Performed by: INTERNAL MEDICINE

## 2023-09-20 PROCEDURE — 99213 PR OFFICE/OUTPT VISIT, EST, LEVL III, 20-29 MIN: ICD-10-PCS | Mod: S$PBB,25,, | Performed by: INTERNAL MEDICINE

## 2023-09-20 PROCEDURE — 94726 PULM FUNCT TST PLETHYSMOGRAP: ICD-10-PCS | Mod: 26,,, | Performed by: INTERNAL MEDICINE

## 2023-09-20 PROCEDURE — 4010F ACE/ARB THERAPY RXD/TAKEN: CPT | Mod: CPTII,,, | Performed by: INTERNAL MEDICINE

## 2023-09-20 PROCEDURE — 3066F PR DOCUMENTATION OF TREATMENT FOR NEPHROPATHY: ICD-10-PCS | Mod: CPTII,,, | Performed by: INTERNAL MEDICINE

## 2023-09-20 PROCEDURE — 94726 PLETHYSMOGRAPHY LUNG VOLUMES: CPT | Mod: 26,,, | Performed by: INTERNAL MEDICINE

## 2023-09-20 PROCEDURE — 3066F NEPHROPATHY DOC TX: CPT | Mod: CPTII,,, | Performed by: INTERNAL MEDICINE

## 2023-09-20 NOTE — PROGRESS NOTES
Subjective:       Patient ID: Arlette Cisneros is a 62 y.o. female.    Chief Complaint: Restrictive Airway Disease    Patient presents today for shortness of breath is markedly better      Past Medical History:   Diagnosis Date    Anemia     Cervical radiculopathy 12/23/2018    Degenerative joint disease involving multiple joints 12/21/2020    Hypercholesteremia 03/23/2021    Hypertension     Restrictive lung disease 03/23/2021    Temporomandibular joint disorder 04/30/2018     Past Surgical History:   Procedure Laterality Date    APPENDECTOMY      HYSTERECTOMY       Family History   Problem Relation Age of Onset    Stroke Mother     Heart disease Mother     Cancer Father     Kidney disease Father      Review of patient's allergies indicates:   Allergen Reactions    Codeine sulfate Shortness Of Breath      Social History     Tobacco Use    Smoking status: Never     Passive exposure: Never    Smokeless tobacco: Never   Substance Use Topics    Alcohol use: Never    Drug use: Not Currently      Review of Systems   Constitutional:  Negative for chills, activity change and night sweats.   HENT:  Negative for congestion and ear pain.    Eyes:  Negative for redness and itching.   Cardiovascular:  Negative for chest pain and palpitations.   Musculoskeletal:  Negative for arthralgias and back pain.   Skin:  Negative for rash.   Gastrointestinal:  Negative for abdominal pain and abdominal distention.   Neurological:  Negative for dizziness and headaches.   Hematological:  Negative for adenopathy. Does not bruise/bleed easily.   Psychiatric/Behavioral:  Negative for confusion. The patient is not nervous/anxious.        Objective:      Physical Exam   Constitutional: She is oriented to person, place, and time. She appears well-developed and well-nourished.   HENT:   Head: Normocephalic.   Nose: Nose normal.   Mouth/Throat: Oropharynx is clear and moist.   Neck: No JVD present. No thyromegaly present.   Cardiovascular: Normal  "rate, regular rhythm, normal heart sounds and intact distal pulses.   Pulmonary/Chest: Normal expansion, hyperinflation, symmetric chest wall expansion, effort normal and breath sounds normal.   Abdominal: Soft. Bowel sounds are normal.   Musculoskeletal:         General: Normal range of motion.      Cervical back: Normal range of motion and neck supple.   Lymphadenopathy: No supraclavicular adenopathy is present.     She has no cervical adenopathy.   Neurological: She is alert and oriented to person, place, and time. She has normal reflexes.   Skin: Skin is warm and dry.   Psychiatric: She has a normal mood and affect. Her behavior is normal.     Personal Diagnostic Review  none pertinent        9/20/2023     1:30 PM 9/20/2023     1:20 PM 9/13/2023     8:17 AM 8/21/2023     9:59 AM 7/19/2023     1:53 PM 7/12/2023     2:52 PM 7/5/2023     8:46 AM   Pulmonary Function Tests   FVC 4.11 liters         FVC% 68         FEV1 3.19 liters         FEV1% 64         FEF 25-75 2.69         FEF 25-75% 68         TLC (liters) 6.38 liters         TLC% 89         RV 2.11         RV% 139         DLCO (ml/mmHg sec) 24.85 ml/mmHg sec         DLCO% 58         Peak Flow 506 L/min         SpO2  97 % 97 % 97 % 97 % 98 % 97 %   Height  5' 8" (1.727 m) 5' 8" (1.727 m) 5' 8" (1.727 m) 5' 8" (1.727 m) 5' 8" (1.727 m) 5' 8" (1.727 m)   Weight  52.6 kg (116 lb) 52.8 kg (116 lb 6.4 oz) 54.2 kg (119 lb 6.4 oz) 53.1 kg (117 lb) 55.8 kg (123 lb) 54.4 kg (120 lb)   BMI (Calculated)  17.6 17.7 18.2 17.8 18.7 18.3         Assessment:       1. SOB (shortness of breath)        Outpatient Encounter Medications as of 9/20/2023   Medication Sig Dispense Refill    albuterol (VENTOLIN HFA) 90 mcg/actuation inhaler Inhale 2 puffs into the lungs every 6 (six) hours as needed for Wheezing. Rescue 18 g 5    amLODIPine (NORVASC) 5 MG tablet Take 1 tablet (5 mg total) by mouth once daily. 30 tablet 2    famotidine (PEPCID) 40 MG tablet Take 1 tablet (40 mg total) " by mouth once daily. 30 tablet 2    fluticasone-umeclidin-vilanter (TRELEGY ELLIPTA) 100-62.5-25 mcg DsDv Inhale 1 puff into the lungs once daily.      losartan (COZAAR) 50 MG tablet Take 1 tablet (50 mg total) by mouth once daily. 90 tablet 1    omeprazole (PRILOSEC) 20 MG capsule Take 1 daily 90 capsule 1    ciclopirox (PENLAC) 8 % Soln Apply topically nightly. (Patient not taking: Reported on 9/13/2023) 6.6 mL 5    [DISCONTINUED] diclofenac sodium (VOLTAREN) 1 % Gel Apply 2 g topically 4 (four) times daily. (Patient not taking: Reported on 9/13/2023) 450 g 0    [DISCONTINUED] famotidine (PEPCID) 40 MG tablet Take 1 tablet (40 mg total) by mouth once daily. 30 tablet 2    [DISCONTINUED] omeprazole (PRILOSEC) 20 MG capsule TAKE ONE CAPSULE BY MOUTH TWICE DAILY FOR TWO WEEKS THEN MAY decrease TO ONCE DAILY. 90 capsule 1     No facility-administered encounter medications on file as of 9/20/2023.     No orders of the defined types were placed in this encounter.      Plan:       Problem List Items Addressed This Visit          Pulmonary    SOB (shortness of breath)     Patient currently using Trelegy and Ventolin with great improvement in breathing she is never smoked her pulmonary functions today show a mild combined obstructive restrictive ventilatory impairment with normal DLCO continue current regimen I will see her back in 6 months

## 2023-09-20 NOTE — PROCEDURES
Pulmonary function test  Forced vital capacity 1.75 L 50% predicted  FEV1 1.43 L 53% predicted  FEV1 ratio 82%   Decreased lung volumes   Mild small airways disease   Normal DLCO   Mild combined obstructive restrictive ventilatory impairment

## 2023-09-20 NOTE — ASSESSMENT & PLAN NOTE
Patient currently using Trelegy and Ventolin with great improvement in breathing she is never smoked her pulmonary functions today show a mild combined obstructive restrictive ventilatory impairment with normal DLCO continue current regimen I will see her back in 6 months

## 2023-09-26 DIAGNOSIS — K63.5 POLYP OF COLON, UNSPECIFIED PART OF COLON, UNSPECIFIED TYPE: Primary | ICD-10-CM

## 2023-10-13 ENCOUNTER — OFFICE VISIT (OUTPATIENT)
Dept: FAMILY MEDICINE | Facility: CLINIC | Age: 63
End: 2023-10-13
Payer: MEDICARE

## 2023-10-13 VITALS
DIASTOLIC BLOOD PRESSURE: 80 MMHG | TEMPERATURE: 98 F | WEIGHT: 118 LBS | OXYGEN SATURATION: 96 % | BODY MASS INDEX: 17.88 KG/M2 | HEART RATE: 69 BPM | SYSTOLIC BLOOD PRESSURE: 140 MMHG | HEIGHT: 68 IN | RESPIRATION RATE: 18 BRPM

## 2023-10-13 DIAGNOSIS — E78.5 HYPERLIPIDEMIA, UNSPECIFIED HYPERLIPIDEMIA TYPE: Primary | ICD-10-CM

## 2023-10-13 DIAGNOSIS — D64.9 ANEMIA, UNSPECIFIED TYPE: ICD-10-CM

## 2023-10-13 LAB
HCT VFR BLD AUTO: 41.6 % (ref 38–47)
HGB BLD-MCNC: 13.2 G/DL (ref 12–16)

## 2023-10-13 PROCEDURE — 85014 HEMOGLOBIN AND HEMATOCRIT, BLOOD: ICD-10-PCS | Mod: ,,, | Performed by: CLINICAL MEDICAL LABORATORY

## 2023-10-13 PROCEDURE — 4010F ACE/ARB THERAPY RXD/TAKEN: CPT | Mod: ,,,

## 2023-10-13 PROCEDURE — 1159F PR MEDICATION LIST DOCUMENTED IN MEDICAL RECORD: ICD-10-PCS | Mod: ,,,

## 2023-10-13 PROCEDURE — 3288F FALL RISK ASSESSMENT DOCD: CPT | Mod: ,,,

## 2023-10-13 PROCEDURE — 3079F DIAST BP 80-89 MM HG: CPT | Mod: ,,,

## 2023-10-13 PROCEDURE — 1160F PR REVIEW ALL MEDS BY PRESCRIBER/CLIN PHARMACIST DOCUMENTED: ICD-10-PCS | Mod: ,,,

## 2023-10-13 PROCEDURE — 85014 HEMATOCRIT: CPT | Mod: ,,, | Performed by: CLINICAL MEDICAL LABORATORY

## 2023-10-13 PROCEDURE — 1159F MED LIST DOCD IN RCRD: CPT | Mod: ,,,

## 2023-10-13 PROCEDURE — 1101F PR PT FALLS ASSESS DOC 0-1 FALLS W/OUT INJ PAST YR: ICD-10-PCS | Mod: ,,,

## 2023-10-13 PROCEDURE — 3061F NEG MICROALBUMINURIA REV: CPT | Mod: ,,,

## 2023-10-13 PROCEDURE — 3008F PR BODY MASS INDEX (BMI) DOCUMENTED: ICD-10-PCS | Mod: ,,,

## 2023-10-13 PROCEDURE — 1160F RVW MEDS BY RX/DR IN RCRD: CPT | Mod: ,,,

## 2023-10-13 PROCEDURE — 85018 HEMOGLOBIN AND HEMATOCRIT, BLOOD: ICD-10-PCS | Mod: ,,, | Performed by: CLINICAL MEDICAL LABORATORY

## 2023-10-13 PROCEDURE — 99213 PR OFFICE/OUTPT VISIT, EST, LEVL III, 20-29 MIN: ICD-10-PCS | Mod: ,,,

## 2023-10-13 PROCEDURE — 3066F NEPHROPATHY DOC TX: CPT | Mod: ,,,

## 2023-10-13 PROCEDURE — 4010F PR ACE/ARB THEARPY RXD/TAKEN: ICD-10-PCS | Mod: ,,,

## 2023-10-13 PROCEDURE — 3008F BODY MASS INDEX DOCD: CPT | Mod: ,,,

## 2023-10-13 PROCEDURE — 3066F PR DOCUMENTATION OF TREATMENT FOR NEPHROPATHY: ICD-10-PCS | Mod: ,,,

## 2023-10-13 PROCEDURE — 1101F PT FALLS ASSESS-DOCD LE1/YR: CPT | Mod: ,,,

## 2023-10-13 PROCEDURE — 3077F SYST BP >= 140 MM HG: CPT | Mod: ,,,

## 2023-10-13 PROCEDURE — 3079F PR MOST RECENT DIASTOLIC BLOOD PRESSURE 80-89 MM HG: ICD-10-PCS | Mod: ,,,

## 2023-10-13 PROCEDURE — 85018 HEMOGLOBIN: CPT | Mod: ,,, | Performed by: CLINICAL MEDICAL LABORATORY

## 2023-10-13 PROCEDURE — 99213 OFFICE O/P EST LOW 20 MIN: CPT | Mod: ,,,

## 2023-10-13 PROCEDURE — 3288F PR FALLS RISK ASSESSMENT DOCUMENTED: ICD-10-PCS | Mod: ,,,

## 2023-10-13 PROCEDURE — 3077F PR MOST RECENT SYSTOLIC BLOOD PRESSURE >= 140 MM HG: ICD-10-PCS | Mod: ,,,

## 2023-10-13 PROCEDURE — 3061F PR NEG MICROALBUMINURIA RESULT DOCUMENTED/REVIEW: ICD-10-PCS | Mod: ,,,

## 2023-10-13 RX ORDER — ROSUVASTATIN CALCIUM 10 MG/1
10 TABLET, COATED ORAL NIGHTLY
Qty: 90 TABLET | Refills: 1 | Status: SHIPPED | OUTPATIENT
Start: 2023-10-13 | End: 2024-10-12

## 2023-10-13 NOTE — PROGRESS NOTES
Subjective     Patient ID: Arlette Cisneros is a 63 y.o. female.    Chief Complaint: Anemia (States she wants her blood levels (H/H) checked. Patient states after receiving her last iron transfusion on 08/11/2023 her O2 dropped and was told by Dr. Sorto to come and get blood levels checked. ) and Health Maintenance (Hepatitis C Screening -Declined /COVID-19 Vaccine(1) -Declined/Pneumococcal Vaccines (Age 65+)(1 - PCV) Declined/HIV Screening -Declined/TETANUS VACCINE -Declined/Mammogram -Declined/DEXA Scan -Declined/Shingles Vaccine(1 of 2) -Declined/Influenza Vaccine(1) -Declined/Colorectal Cancer Screening due on -DEclined/)      Pt here today for recheck of H&H for Dr. Sorto. Dr. Sorto will need her lab faxed to her as soon as we get the results.She states that is all we need to do today and that she needs to be on a cholesterol medication. Her Risk score is 13.6 %. Denies chest pain, or fast heart beats, denies seeing blood in urine or stool on this visit. She does feel more sob today and very tired and weak. Her BP is elevated today.    Anemia  Presents for follow-up visit. Symptoms include malaise/fatigue. There has been no fever, light-headedness or palpitations. There are no compliance problems.  Compliance with medications is %. Side effects of medications include fatigue.       Review of Systems   Constitutional:  Positive for malaise/fatigue. Negative for activity change, appetite change, fatigue and fever.   HENT:  Negative for trouble swallowing.    Eyes:  Negative for pain and visual disturbance.   Respiratory:  Negative for chest tightness and shortness of breath.    Cardiovascular:  Negative for chest pain and palpitations.   Gastrointestinal:  Negative for change in bowel habit.   Genitourinary:  Negative for difficulty urinating and dysuria.   Musculoskeletal:  Negative for arthralgias and myalgias.   Integumentary:  Negative for rash and wound.   Neurological:  Negative for weakness,  light-headedness and headaches.   Psychiatric/Behavioral:  The patient is not nervous/anxious.             Objective     Physical Exam  Vitals and nursing note reviewed.   Constitutional:       Appearance: Normal appearance. She is not ill-appearing.      Comments: Pt is very fatigued. She just does not have any energy and can't figure out why her blood is so low   HENT:      Head: Normocephalic and atraumatic.      Right Ear: Tympanic membrane, ear canal and external ear normal.      Left Ear: Tympanic membrane, ear canal and external ear normal.      Nose: Nose normal.      Mouth/Throat:      Mouth: Mucous membranes are moist.      Pharynx: Oropharynx is clear.   Eyes:      Extraocular Movements: Extraocular movements intact.      Conjunctiva/sclera: Conjunctivae normal.      Pupils: Pupils are equal, round, and reactive to light.   Cardiovascular:      Rate and Rhythm: Normal rate and regular rhythm.      Pulses: Normal pulses.      Heart sounds: Normal heart sounds.   Pulmonary:      Effort: Pulmonary effort is normal. No respiratory distress.      Breath sounds: Normal breath sounds.   Abdominal:      General: Bowel sounds are normal.      Tenderness: There is no abdominal tenderness.   Musculoskeletal:         General: Normal range of motion.      Cervical back: Normal range of motion and neck supple. No tenderness.   Skin:     General: Skin is warm and dry.      Capillary Refill: Capillary refill takes less than 2 seconds.      Coloration: Skin is pale.   Neurological:      General: No focal deficit present.      Mental Status: She is alert and oriented to person, place, and time.   Psychiatric:         Mood and Affect: Mood normal.         Behavior: Behavior normal.         Thought Content: Thought content normal.         Judgment: Judgment normal.              Assessment and Plan     1. Hyperlipidemia, unspecified hyperlipidemia type  Assessment & Plan:  Started pat on Crestor 10 mg today for elevated  cholesterol    Orders:  -     rosuvastatin (CRESTOR) 10 MG tablet; Take 1 tablet (10 mg total) by mouth every evening.  Dispense: 90 tablet; Refill: 1    2. Anemia, unspecified type  Assessment & Plan:  Will draw an H & H today and will fax the result to Dr. Sorto as soon as resulted    Orders:  -     Hemoglobin and Hematocrit; Future; Expected date: 10/13/2023               Follow up in about 3 months (around 1/13/2024).

## 2023-10-16 ENCOUNTER — OFFICE VISIT (OUTPATIENT)
Dept: FAMILY MEDICINE | Facility: CLINIC | Age: 63
End: 2023-10-16
Payer: MEDICARE

## 2023-10-16 VITALS
HEIGHT: 68 IN | TEMPERATURE: 98 F | BODY MASS INDEX: 18.04 KG/M2 | HEART RATE: 82 BPM | RESPIRATION RATE: 18 BRPM | DIASTOLIC BLOOD PRESSURE: 80 MMHG | WEIGHT: 119 LBS | SYSTOLIC BLOOD PRESSURE: 130 MMHG | OXYGEN SATURATION: 98 %

## 2023-10-16 DIAGNOSIS — Z12.4 SCREENING FOR MALIGNANT NEOPLASM OF CERVIX: Primary | ICD-10-CM

## 2023-10-16 PROCEDURE — 99212 PR OFFICE/OUTPT VISIT, EST, LEVL II, 10-19 MIN: ICD-10-PCS | Mod: ,,,

## 2023-10-16 PROCEDURE — 3008F BODY MASS INDEX DOCD: CPT | Mod: ,,,

## 2023-10-16 PROCEDURE — 1101F PT FALLS ASSESS-DOCD LE1/YR: CPT | Mod: ,,,

## 2023-10-16 PROCEDURE — 4010F PR ACE/ARB THEARPY RXD/TAKEN: ICD-10-PCS | Mod: ,,,

## 2023-10-16 PROCEDURE — 1160F PR REVIEW ALL MEDS BY PRESCRIBER/CLIN PHARMACIST DOCUMENTED: ICD-10-PCS | Mod: ,,,

## 2023-10-16 PROCEDURE — 1101F PR PT FALLS ASSESS DOC 0-1 FALLS W/OUT INJ PAST YR: ICD-10-PCS | Mod: ,,,

## 2023-10-16 PROCEDURE — 3066F PR DOCUMENTATION OF TREATMENT FOR NEPHROPATHY: ICD-10-PCS | Mod: ,,,

## 2023-10-16 PROCEDURE — 3075F SYST BP GE 130 - 139MM HG: CPT | Mod: ,,,

## 2023-10-16 PROCEDURE — 3079F PR MOST RECENT DIASTOLIC BLOOD PRESSURE 80-89 MM HG: ICD-10-PCS | Mod: ,,,

## 2023-10-16 PROCEDURE — 3061F NEG MICROALBUMINURIA REV: CPT | Mod: ,,,

## 2023-10-16 PROCEDURE — 3288F FALL RISK ASSESSMENT DOCD: CPT | Mod: ,,,

## 2023-10-16 PROCEDURE — 3061F PR NEG MICROALBUMINURIA RESULT DOCUMENTED/REVIEW: ICD-10-PCS | Mod: ,,,

## 2023-10-16 PROCEDURE — 4010F ACE/ARB THERAPY RXD/TAKEN: CPT | Mod: ,,,

## 2023-10-16 PROCEDURE — 3288F PR FALLS RISK ASSESSMENT DOCUMENTED: ICD-10-PCS | Mod: ,,,

## 2023-10-16 PROCEDURE — 3075F PR MOST RECENT SYSTOLIC BLOOD PRESS GE 130-139MM HG: ICD-10-PCS | Mod: ,,,

## 2023-10-16 PROCEDURE — 1159F PR MEDICATION LIST DOCUMENTED IN MEDICAL RECORD: ICD-10-PCS | Mod: ,,,

## 2023-10-16 PROCEDURE — 1159F MED LIST DOCD IN RCRD: CPT | Mod: ,,,

## 2023-10-16 PROCEDURE — 3008F PR BODY MASS INDEX (BMI) DOCUMENTED: ICD-10-PCS | Mod: ,,,

## 2023-10-16 PROCEDURE — 99212 OFFICE O/P EST SF 10 MIN: CPT | Mod: ,,,

## 2023-10-16 PROCEDURE — 3079F DIAST BP 80-89 MM HG: CPT | Mod: ,,,

## 2023-10-16 PROCEDURE — 3066F NEPHROPATHY DOC TX: CPT | Mod: ,,,

## 2023-10-16 PROCEDURE — 88142 CYTOPATH C/V THIN LAYER: CPT | Mod: TC,GCY

## 2023-10-16 PROCEDURE — 1160F RVW MEDS BY RX/DR IN RCRD: CPT | Mod: ,,,

## 2023-10-16 NOTE — PROGRESS NOTES
Her H&H is normal. Please fax results to Dr. Beronica Sorto in [place] at the cancer center at [facility].

## 2023-10-16 NOTE — PROGRESS NOTES
LAYNE CANDELARIO   [address] [place]  [address]      PATIENT NAME: Arlette Cisneros  : 1960  DATE: 10/16/23  MRN: [id number]      Billing Provider: LAYNE CANDELARIO  Level of Service:   Patient PCP Information       Provider PCP Type    SAAD TORRES NP General            Reason for Visit / Chief Complaint: Abnormal Pap Smear (Pt is here for a pap smear. )         History of Present Illness / Problem Focused Workflow     Arlette Cisneros presents to the clinic with Abnormal Pap Smear (Pt is here for a pap smear. )     HPI    Review of Systems     @Review of Systems    Medical / Social / Family History     Past Medical History:   Diagnosis Date    Anemia     Cervical radiculopathy 2018    Degenerative joint disease involving multiple joints 2020    Hypercholesteremia 2021    Hypertension     Restrictive lung disease 2021    Temporomandibular joint disorder 2018       Past Surgical History:   Procedure Laterality Date    APPENDECTOMY      HYSTERECTOMY         Social History  Ms.  reports that she has never smoked. She has never been exposed to tobacco smoke. She has never used smokeless tobacco. She reports that she does not currently use drugs. She reports that she does not drink alcohol.    Family History  Ms.'s family history includes Cancer in her father; Heart disease in her mother; Kidney disease in her father; Stroke in her mother.    Medications and Allergies     Medications  Outpatient Medications Marked as Taking for the 10/16/23 encounter (Office Visit) with Dipak Floyd FNP   Medication Sig Dispense Refill    albuterol (VENTOLIN HFA) 90 mcg/actuation inhaler Inhale 2 puffs into the lungs every 6 (six) hours as needed for Wheezing. Rescue 18 g 5    amLODIPine (NORVASC) 5 MG tablet Take 1 tablet (5 mg total) by mouth once daily. 30 tablet 2    famotidine (PEPCID) 40 MG tablet Take 1 tablet (40 mg total) by mouth once  daily. 30 tablet 2    fluticasone-umeclidin-vilanter (TRELEGY ELLIPTA) 100-62.5-25 mcg DsDv Inhale 1 puff into the lungs once daily.      losartan (COZAAR) 50 MG tablet Take 1 tablet (50 mg total) by mouth once daily. 90 tablet 1    omeprazole (PRILOSEC) 20 MG capsule Take 1 daily 90 capsule 1    rosuvastatin (CRESTOR) 10 MG tablet Take 1 tablet (10 mg total) by mouth every evening. 90 tablet 1       Allergies  Review of patient's allergies indicates:   Allergen Reactions    Codeine sulfate Shortness Of Breath       Physical Examination     Vitals:    10/16/23 1006   BP: 130/80   Pulse: 82   Resp: 18   Temp: 98 °F (36.7 °C)     Physical Exam          Lab Results   Component Value Date    WBC 3.31 (L) 09/13/2023    HGB 13.2 10/13/2023    HCT 41.6 10/13/2023    MCV 83.8 09/13/2023     09/13/2023        CMP  Sodium   Date Value Ref Range Status   09/13/2023 142 136 - 145 mmol/L Final     Potassium   Date Value Ref Range Status   09/13/2023 3.7 3.5 - 5.1 mmol/L Final     Chloride   Date Value Ref Range Status   09/13/2023 109 (H) 98 - 107 mmol/L Final     CO2   Date Value Ref Range Status   09/13/2023 25 21 - 32 mmol/L Final     Glucose   Date Value Ref Range Status   09/13/2023 114 (H) 74 - 106 mg/dL Final     BUN   Date Value Ref Range Status   09/13/2023 16 7 - 18 mg/dL Final     Creatinine   Date Value Ref Range Status   09/13/2023 0.71 0.55 - 1.02 mg/dL Final     Calcium   Date Value Ref Range Status   09/13/2023 9.2 8.5 - 10.1 mg/dL Final     Total Protein   Date Value Ref Range Status   09/13/2023 7.5 6.4 - 8.2 g/dL Final     Albumin   Date Value Ref Range Status   09/13/2023 3.7 3.5 - 5.0 g/dL Final     Bilirubin, Total   Date Value Ref Range Status   09/13/2023 0.4 >0.0 - 1.2 mg/dL Final     Alk Phos   Date Value Ref Range Status   09/13/2023 103 50 - 130 U/L Final     AST   Date Value Ref Range Status   09/13/2023 34 15 - 37 U/L Final     ALT   Date Value Ref Range Status   09/13/2023 43 13 - 56 U/L  Final     Anion Gap   Date Value Ref Range Status   09/13/2023 12 7 - 16 mmol/L Final     eGFR   Date Value Ref Range Status   09/13/2023 95 >=60 mL/min/1.73m2 Final     Procedures   Assessment and Plan (including Health Maintenance)   :    Plan:           Problem List Items Addressed This Visit    None      Health Maintenance Topics with due status: Not Due       Topic Last Completion Date    Lipid Panel 09/13/2023       Future Appointments   Date Time Provider Department Center   2/7/2024  1:00 PM [facility] GI ROOM 02 [facility]        Health Maintenance Due   Topic Date Due    Hepatitis C Screening  Never done    COVID-19 Vaccine (1) Never done    Pneumococcal Vaccines (Age 65+) (1 - PCV) Never done    HIV Screening  Never done    TETANUS VACCINE  Never done    Mammogram  Never done    DEXA Scan  Never done    Shingles Vaccine (1 of 2) Never done    Colorectal Cancer Screening  09/09/2023        No follow-ups on file.     Signature:  LAYNE CANDELARIO  [address] [place]  [address]    Date of encounter: 10/16/23

## 2023-10-18 LAB
GH SERPL-MCNC: NORMAL NG/ML
INSULIN SERPL-ACNC: NORMAL U[IU]/ML
LAB AP CLINICAL INFORMATION: NORMAL
LAB AP GYN INTERPRETATION: NEGATIVE
LAB AP PAP DISCLAIMER COMMENTS: NORMAL
RENIN PLAS-CCNC: NORMAL NG/ML/H

## 2023-12-12 PROBLEM — Z12.4 SCREENING FOR MALIGNANT NEOPLASM OF CERVIX: Status: ACTIVE | Noted: 2023-12-12

## 2023-12-12 NOTE — PROGRESS NOTES
LAYEN CANDELARIO   [place] [address]  [place]  [address]      PATIENT NAME: Arlette Cisneros  : 1960  DATE: 10/16/23  MRN: [id number]        Reason for Visit / Chief Complaint: Abnormal Pap Smear (Pt is here for a pap smear. )         History of Present Illness / Problem Focused Workflow     62 y/o female presents to clinic for pap smear. Denies any concerns or questions at present.      Review of Systems     @Review of Systems   Constitutional:  Negative for chills, fatigue and fever.   HENT:  Negative for nasal congestion, ear discharge, ear pain, rhinorrhea, sinus pressure/congestion and sore throat.    Respiratory:  Negative for cough, chest tightness, shortness of breath, wheezing and stridor.    Cardiovascular:  Negative for palpitations and claudication.   Gastrointestinal:  Negative for abdominal pain, constipation, diarrhea, nausea, vomiting and reflux.   Genitourinary:  Negative for dysuria, flank pain, frequency, hematuria and urgency.   Musculoskeletal:  Negative for myalgias.   Neurological:  Negative for dizziness, weakness, light-headedness and headaches.   Psychiatric/Behavioral:  Negative for suicidal ideas.        Medical / Social / Family History     Past Medical History:   Diagnosis Date    Anemia     Cervical radiculopathy 2018    Degenerative joint disease involving multiple joints 2020    Hypercholesteremia 2021    Hypertension     Restrictive lung disease 2021    Temporomandibular joint disorder 2018       Past Surgical History:   Procedure Laterality Date    APPENDECTOMY      HYSTERECTOMY             Medications and Allergies     Medications  Outpatient Medications Marked as Taking for the 10/16/23 encounter (Office Visit) with Dipak Floyd FNP   Medication Sig Dispense Refill    albuterol (VENTOLIN HFA) 90 mcg/actuation inhaler Inhale 2 puffs into the lungs every 6 (six) hours as needed for Wheezing. Rescue 18 g 5     amLODIPine (NORVASC) 5 MG tablet Take 1 tablet (5 mg total) by mouth once daily. 30 tablet 2    famotidine (PEPCID) 40 MG tablet Take 1 tablet (40 mg total) by mouth once daily. 30 tablet 2    fluticasone-umeclidin-vilanter (TRELEGY ELLIPTA) 100-62.5-25 mcg DsDv Inhale 1 puff into the lungs once daily.      losartan (COZAAR) 50 MG tablet Take 1 tablet (50 mg total) by mouth once daily. 90 tablet 1    omeprazole (PRILOSEC) 20 MG capsule Take 1 daily 90 capsule 1    rosuvastatin (CRESTOR) 10 MG tablet Take 1 tablet (10 mg total) by mouth every evening. 90 tablet 1       Allergies  Review of patient's allergies indicates:   Allergen Reactions    Codeine sulfate Shortness Of Breath       Physical Examination     Vitals:    10/16/23 1006   BP: 130/80   Pulse: 82   Resp: 18   Temp: 98 °F (36.7 °C)     Physical Exam  Vitals and nursing note reviewed.   Constitutional:       General: She is awake.      Appearance: Normal appearance.   HENT:      Head: Normocephalic.      Right Ear: Tympanic membrane, ear canal and external ear normal.      Left Ear: Tympanic membrane, ear canal and external ear normal.      Nose: Nose normal.      Mouth/Throat:      Lips: Pink.      Mouth: Mucous membranes are moist.      Pharynx: Oropharynx is clear. Uvula midline.   Cardiovascular:      Rate and Rhythm: Normal rate and regular rhythm.      Heart sounds: Normal heart sounds, S1 normal and S2 normal.   Pulmonary:      Effort: Pulmonary effort is normal. No respiratory distress.      Breath sounds: Normal breath sounds. No decreased breath sounds, wheezing, rhonchi or rales.   Abdominal:      General: Bowel sounds are normal.      Palpations: Abdomen is soft.      Tenderness: There is no abdominal tenderness.   Musculoskeletal:      Cervical back: Normal range of motion.   Skin:     General: Skin is warm.      Capillary Refill: Capillary refill takes less than 2 seconds.   Neurological:      Mental Status: She is alert and oriented to  person, place, and time.   Psychiatric:         Thought Content: Thought content does not include homicidal or suicidal ideation. Thought content does not include homicidal or suicidal plan.               Procedures   Assessment and Plan (including Health Maintenance)   :    Plan:     Problem List Items Addressed This Visit          Renal/    Screening for malignant neoplasm of cervix - Primary    Current Assessment & Plan     Pap smear today. Pt tolerated well. Will call with results and any new orders.          Relevant Orders    ThinPrep Pap Test (Completed)       Health Maintenance Topics with due status: Not Due       Topic Last Completion Date    Lipid Panel 09/13/2023       Future Appointments   Date Time Provider Department Center   2/7/2024  1:00 PM [facility] GI ROOM 02 [facility]        Health Maintenance Due   Topic Date Due    Hepatitis C Screening  Never done    COVID-19 Vaccine (1) Never done    Pneumococcal Vaccines (Age 65+) (1 - PCV) Never done    HIV Screening  Never done    TETANUS VACCINE  Never done    Mammogram  Never done    DEXA Scan  Never done    Shingles Vaccine (1 of 2) Never done    RSV Vaccine (Age 60+ and Pregnant patients) (1 - 1-dose 60+ series) Never done    Colorectal Cancer Screening  09/09/2023        No follow-ups on file.     Signature:  ANA WHYTE PETER  [facility]  [address] [place] [address], [place]  [address]    Date of encounter: 10/16/23

## 2024-01-09 DIAGNOSIS — Z71.89 COMPLEX CARE COORDINATION: ICD-10-CM

## 2024-02-07 ENCOUNTER — HOSPITAL ENCOUNTER (OUTPATIENT)
Dept: GASTROENTEROLOGY | Facility: HOSPITAL | Age: 64
Discharge: HOME | End: 2024-02-07
Attending: INTERNAL MEDICINE
Payer: MEDICARE

## 2024-02-07 DIAGNOSIS — Z12.11 SCREENING FOR COLON CANCER: Primary | ICD-10-CM

## 2024-02-07 DIAGNOSIS — K63.5 POLYP OF COLON, UNSPECIFIED PART OF COLON, UNSPECIFIED TYPE: ICD-10-CM

## 2024-02-07 RX ORDER — POLYETHYLENE GLYCOL 3350, SODIUM SULFATE ANHYDROUS, SODIUM BICARBONATE, SODIUM CHLORIDE, POTASSIUM CHLORIDE 236; 22.74; 6.74; 5.86; 2.97 G/4L; G/4L; G/4L; G/4L; G/4L
4 POWDER, FOR SOLUTION ORAL ONCE
Qty: 4000 ML | Refills: 0 | Status: SHIPPED | OUTPATIENT
Start: 2024-02-07 | End: 2024-02-07

## 2024-02-26 DIAGNOSIS — K21.9 GASTROESOPHAGEAL REFLUX DISEASE WITHOUT ESOPHAGITIS: ICD-10-CM

## 2024-02-26 RX ORDER — FAMOTIDINE 40 MG/1
40 TABLET, FILM COATED ORAL
Qty: 30 TABLET | Refills: 2 | Status: SHIPPED | OUTPATIENT
Start: 2024-02-26 | End: 2024-05-30 | Stop reason: SDUPTHER

## 2024-03-11 LAB — CRC RECOMMENDATION EXT: NORMAL

## 2024-05-30 ENCOUNTER — OFFICE VISIT (OUTPATIENT)
Dept: FAMILY MEDICINE | Facility: CLINIC | Age: 64
End: 2024-05-30
Payer: MEDICARE

## 2024-05-30 VITALS
OXYGEN SATURATION: 97 % | RESPIRATION RATE: 19 BRPM | HEART RATE: 78 BPM | HEIGHT: 66 IN | BODY MASS INDEX: 18.06 KG/M2 | DIASTOLIC BLOOD PRESSURE: 90 MMHG | WEIGHT: 112.38 LBS | TEMPERATURE: 98 F | SYSTOLIC BLOOD PRESSURE: 140 MMHG

## 2024-05-30 DIAGNOSIS — D64.9 ANEMIA, UNSPECIFIED TYPE: Primary | ICD-10-CM

## 2024-05-30 DIAGNOSIS — I10 ESSENTIAL HYPERTENSION: ICD-10-CM

## 2024-05-30 DIAGNOSIS — I10 HYPERTENSION, UNSPECIFIED TYPE: ICD-10-CM

## 2024-05-30 DIAGNOSIS — E78.5 HYPERLIPIDEMIA, UNSPECIFIED HYPERLIPIDEMIA TYPE: ICD-10-CM

## 2024-05-30 DIAGNOSIS — K21.9 GASTROESOPHAGEAL REFLUX DISEASE WITHOUT ESOPHAGITIS: ICD-10-CM

## 2024-05-30 PROCEDURE — 1101F PT FALLS ASSESS-DOCD LE1/YR: CPT | Mod: ,,,

## 2024-05-30 PROCEDURE — 1126F AMNT PAIN NOTED NONE PRSNT: CPT | Mod: ,,,

## 2024-05-30 PROCEDURE — 3080F DIAST BP >= 90 MM HG: CPT | Mod: ,,,

## 2024-05-30 PROCEDURE — 99214 OFFICE O/P EST MOD 30 MIN: CPT | Mod: ,,,

## 2024-05-30 PROCEDURE — 4010F ACE/ARB THERAPY RXD/TAKEN: CPT | Mod: ,,,

## 2024-05-30 PROCEDURE — 3008F BODY MASS INDEX DOCD: CPT | Mod: ,,,

## 2024-05-30 PROCEDURE — 3044F HG A1C LEVEL LT 7.0%: CPT | Mod: ,,,

## 2024-05-30 PROCEDURE — 3077F SYST BP >= 140 MM HG: CPT | Mod: ,,,

## 2024-05-30 PROCEDURE — 3288F FALL RISK ASSESSMENT DOCD: CPT | Mod: ,,,

## 2024-05-30 RX ORDER — ROSUVASTATIN CALCIUM 10 MG/1
10 TABLET, COATED ORAL NIGHTLY
Qty: 90 TABLET | Refills: 1 | Status: SHIPPED | OUTPATIENT
Start: 2024-05-30

## 2024-05-30 RX ORDER — LOSARTAN POTASSIUM 50 MG/1
50 TABLET ORAL DAILY
Qty: 90 TABLET | Refills: 1 | Status: SHIPPED | OUTPATIENT
Start: 2024-05-30

## 2024-05-30 RX ORDER — AMLODIPINE BESYLATE 5 MG/1
5 TABLET ORAL DAILY
Qty: 90 TABLET | Refills: 1 | Status: SHIPPED | OUTPATIENT
Start: 2024-05-30

## 2024-05-30 RX ORDER — OMEPRAZOLE 20 MG/1
CAPSULE, DELAYED RELEASE ORAL
Qty: 90 CAPSULE | Refills: 1 | Status: SHIPPED | OUTPATIENT
Start: 2024-05-30

## 2024-05-30 RX ORDER — FAMOTIDINE 40 MG/1
40 TABLET, FILM COATED ORAL DAILY
Qty: 90 TABLET | Refills: 1 | Status: SHIPPED | OUTPATIENT
Start: 2024-05-30

## 2024-05-30 NOTE — PROGRESS NOTES
LAYNE CANDELARIO   [place] [address]  [place]  [address]      PATIENT NAME: Arlette Cisneros  : 1960  DATE: 24  MRN: [id number]                History of Present Illness / Problem Focused Workflow     Establish Care (Patient presents to clinic to establish care with provider. She is reporting history of low blood count and needing up to date lab work. ) and Fatigue (Patient states from her history of low blood count she has been feeling fatigued. )      Review of Systems     @Review of Systems   Constitutional:  Positive for fatigue. Negative for chills and fever.   HENT:  Negative for nasal congestion, ear discharge, ear pain, rhinorrhea, sinus pressure/congestion and sore throat.    Respiratory:  Negative for cough, chest tightness, shortness of breath, wheezing and stridor.    Cardiovascular:  Negative for palpitations and claudication.   Gastrointestinal:  Negative for abdominal pain, constipation, diarrhea, nausea, vomiting and reflux.   Genitourinary:  Negative for dysuria, flank pain, frequency, hematuria and urgency.   Musculoskeletal:  Negative for myalgias.   Neurological:  Negative for dizziness, weakness, light-headedness and headaches.   Psychiatric/Behavioral:  Negative for suicidal ideas.        Medical / Social / Family History     Past Medical History:   Diagnosis Date    Anemia     Cervical radiculopathy 2018    Degenerative joint disease involving multiple joints 2020    Hypercholesteremia 2021    Hypertension     Restrictive lung disease 2021    Temporomandibular joint disorder 2018       Past Surgical History:   Procedure Laterality Date    APPENDECTOMY      HYSTERECTOMY             Medications and Allergies     Medications  Outpatient Medications Marked as Taking for the 24 encounter (Office Visit) with Dipak Floyd FNP   Medication Sig Dispense Refill    albuterol (VENTOLIN HFA) 90 mcg/actuation inhaler Inhale 2  puffs into the lungs every 6 (six) hours as needed for Wheezing. Rescue 18 g 5    fluticasone-umeclidin-vilanter (TRELEGY ELLIPTA) 100-62.5-25 mcg DsDv Inhale 1 puff into the lungs once daily. (Patient not taking: Reported on 6/19/2024)      [DISCONTINUED] amLODIPine (NORVASC) 5 MG tablet Take 1 tablet (5 mg total) by mouth once daily. 30 tablet 2    [DISCONTINUED] famotidine (PEPCID) 40 MG tablet TAKE ONE TABLET BY MOUTH ONCE DAILY 30 tablet 2    [DISCONTINUED] losartan (COZAAR) 50 MG tablet Take 1 tablet (50 mg total) by mouth once daily. 90 tablet 1    [DISCONTINUED] omeprazole (PRILOSEC) 20 MG capsule Take 1 daily 90 capsule 1    [DISCONTINUED] rosuvastatin (CRESTOR) 10 MG tablet Take 1 tablet (10 mg total) by mouth every evening. 90 tablet 1       Allergies  Review of patient's allergies indicates:   Allergen Reactions    Codeine sulfate Shortness Of Breath       Physical Examination     Vitals:    05/30/24 0927   BP: (!) 140/90   Pulse: 78   Resp: 19   Temp: 97.9 °F (36.6 °C)     Physical Exam  Vitals and nursing note reviewed.   Constitutional:       General: She is awake.      Appearance: Normal appearance.   HENT:      Head: Normocephalic.      Right Ear: Tympanic membrane, ear canal and external ear normal.      Left Ear: Tympanic membrane, ear canal and external ear normal.      Nose: Nose normal.      Mouth/Throat:      Lips: Pink.      Mouth: Mucous membranes are moist.      Pharynx: Oropharynx is clear. Uvula midline.   Cardiovascular:      Rate and Rhythm: Normal rate and regular rhythm.      Heart sounds: Normal heart sounds, S1 normal and S2 normal.   Pulmonary:      Effort: Pulmonary effort is normal. No respiratory distress.      Breath sounds: Normal breath sounds. No decreased breath sounds, wheezing, rhonchi or rales.   Abdominal:      General: Bowel sounds are normal.      Palpations: Abdomen is soft.      Tenderness: There is no abdominal tenderness.   Musculoskeletal:      Cervical back:  Normal range of motion.   Skin:     General: Skin is warm.      Capillary Refill: Capillary refill takes less than 2 seconds.   Neurological:      Mental Status: She is alert and oriented to person, place, and time.   Psychiatric:         Thought Content: Thought content does not include homicidal or suicidal ideation. Thought content does not include homicidal or suicidal plan.               Procedures   Assessment and Plan (including Health Maintenance)   :    Plan:     Problem List Items Addressed This Visit          Cardiac/Vascular    Essential hypertension    Current Assessment & Plan     Monitor BP daily and keep BP daily log to bring to next visit. Exercise at least 5 times a week. Keep scheduled appts. RTC sooner if BP is staying <120/70. Dash diet.    DASH- includes foods rich in K+, calcium and Magnesium.The diet limits foods high in sodium, saturated fats and added sugars. This is a flexible balanced eating plan that helps create heart healthy eating style for life. Diet is rich in vegetable, whole grains and fruits. It includes fat free or low fat dairy products, fish, poultry, nuts. Chose foods high in K+, calcium, magnesium, fiber, protein, and low in saturated fats and sodium.          Relevant Medications    amLODIPine (NORVASC) 5 MG tablet    losartan (COZAAR) 50 MG tablet    Hyperlipidemia    Current Assessment & Plan     Hyperlipidemia is controlled. Continue current medication, is effective at this time. Return to the clinic as needed.   Low fat, low chol diet, less fried foods, more baked foods, more vegetables.   Exercise daily  Take medications as ordered for hyperlipidemia  Return to the clinic as needed   Follow up in 6 months   Lipids panel today ,will call with results and adjust medications accordingly.           Relevant Medications    rosuvastatin (CRESTOR) 10 MG tablet    Hypertension    Relevant Medications    amLODIPine (NORVASC) 5 MG tablet       Oncology    Anemia - Primary     Current Assessment & Plan     Will get CBC today. She is fatigued often and has a history of anemia.             GI    Gastroesophageal reflux disease without esophagitis    Current Assessment & Plan     Continue current medication, is effective at this time. Return to the clinic as needed.   -Avoid spicy, greasy foods  -Avoid caffeine, citric acid, chocolate, peppermint, and carbonated drinks  -Do not lay down within 3 hours of eating  -Exercise 150 minutes per week  -Increase fluid to 64 ounces daily  -Avoid antiinflammatory medications such as motrin, advil, aleve, ibuprofen, and BC powder           Relevant Medications    famotidine (PEPCID) 40 MG tablet    omeprazole (PRILOSEC) 20 MG capsule       Health Maintenance Topics with due status: Not Due       Topic Last Completion Date    Influenza Vaccine 10/16/2023    Lipid Panel 06/19/2024       Future Appointments   Date Time Provider Department Center   8/8/2024  1:30 PM [facility] XR4 DEXA [facility] XRAY [facility]   8/8/2024  2:00 PM [facility] MAMMO1 [facility] MAMMO [facility]   9/19/2024  8:20 AM Lorene Morgan FNP [facility]        Health Maintenance Due   Topic Date Due    Pneumococcal Vaccines (Age 65+) (1 of 2 - PCV) Never done    TETANUS VACCINE  Never done    Mammogram  Never done    DEXA Scan  Never done    Shingles Vaccine (1 of 2) Never done    RSV Vaccine (Age 60+ and Pregnant patients) (1 - 1-dose 60+ series) Never done    COVID-19 Vaccine (1 - 2023-24 season) Never done    Colorectal Cancer Screening  09/09/2023        Follow up in about 4 weeks (around 6/27/2024).     Signature:  LAYNE CANDELARIO  [facility]  [address] [place] [address], [place]  [address]    Date of encounter: 5/30/24

## 2024-06-19 ENCOUNTER — OFFICE VISIT (OUTPATIENT)
Dept: FAMILY MEDICINE | Facility: CLINIC | Age: 64
End: 2024-06-19
Payer: MEDICARE

## 2024-06-19 VITALS
HEIGHT: 66 IN | RESPIRATION RATE: 18 BRPM | SYSTOLIC BLOOD PRESSURE: 166 MMHG | WEIGHT: 112.38 LBS | OXYGEN SATURATION: 98 % | BODY MASS INDEX: 18.06 KG/M2 | DIASTOLIC BLOOD PRESSURE: 100 MMHG | HEART RATE: 80 BPM | TEMPERATURE: 98 F

## 2024-06-19 DIAGNOSIS — R73.9 ELEVATED BLOOD SUGAR: ICD-10-CM

## 2024-06-19 DIAGNOSIS — I10 HYPERTENSION, UNSPECIFIED TYPE: Primary | ICD-10-CM

## 2024-06-19 DIAGNOSIS — E78.5 HYPERLIPIDEMIA, UNSPECIFIED HYPERLIPIDEMIA TYPE: ICD-10-CM

## 2024-06-19 DIAGNOSIS — M81.0 POST-MENOPAUSAL OSTEOPOROSIS: ICD-10-CM

## 2024-06-19 DIAGNOSIS — D53.9 NUTRITIONAL ANEMIA, UNSPECIFIED: ICD-10-CM

## 2024-06-19 DIAGNOSIS — K21.9 GASTROESOPHAGEAL REFLUX DISEASE WITHOUT ESOPHAGITIS: ICD-10-CM

## 2024-06-19 DIAGNOSIS — D64.9 ANEMIA, UNSPECIFIED TYPE: ICD-10-CM

## 2024-06-19 DIAGNOSIS — Z12.39 ENCOUNTER FOR SCREENING FOR MALIGNANT NEOPLASM OF BREAST, UNSPECIFIED SCREENING MODALITY: ICD-10-CM

## 2024-06-19 DIAGNOSIS — Z11.4 SCREENING FOR HIV (HUMAN IMMUNODEFICIENCY VIRUS): ICD-10-CM

## 2024-06-19 DIAGNOSIS — Z12.31 ENCOUNTER FOR SCREENING MAMMOGRAM FOR MALIGNANT NEOPLASM OF BREAST: ICD-10-CM

## 2024-06-19 DIAGNOSIS — Z11.59 NEED FOR HEPATITIS C SCREENING TEST: ICD-10-CM

## 2024-06-19 DIAGNOSIS — R53.83 FATIGUE, UNSPECIFIED TYPE: ICD-10-CM

## 2024-06-19 LAB
ALBUMIN SERPL BCP-MCNC: 3.4 G/DL (ref 3.5–5)
ALBUMIN/GLOB SERPL: 1 {RATIO}
ALP SERPL-CCNC: 99 U/L (ref 55–142)
ALT SERPL W P-5'-P-CCNC: 40 U/L (ref 13–56)
ANION GAP SERPL CALCULATED.3IONS-SCNC: 13 MMOL/L (ref 7–16)
AST SERPL W P-5'-P-CCNC: 35 U/L (ref 15–37)
BASOPHILS # BLD AUTO: 0.03 K/UL (ref 0–0.2)
BASOPHILS NFR BLD AUTO: 1.1 % (ref 0–1)
BILIRUB SERPL-MCNC: 0.6 MG/DL (ref ?–1.2)
BUN SERPL-MCNC: 12 MG/DL (ref 7–18)
BUN/CREAT SERPL: 24 (ref 6–20)
CALCIUM SERPL-MCNC: 8.9 MG/DL (ref 8.5–10.1)
CHLORIDE SERPL-SCNC: 108 MMOL/L (ref 98–107)
CHOLEST SERPL-MCNC: 248 MG/DL (ref 0–200)
CHOLEST/HDLC SERPL: 4.7 {RATIO}
CO2 SERPL-SCNC: 24 MMOL/L (ref 21–32)
CREAT SERPL-MCNC: 0.51 MG/DL (ref 0.55–1.02)
DIFFERENTIAL METHOD BLD: ABNORMAL
EGFR (NO RACE VARIABLE) (RUSH/TITUS): 104 ML/MIN/1.73M2
EOSINOPHIL # BLD AUTO: 0.16 K/UL (ref 0–0.5)
EOSINOPHIL NFR BLD AUTO: 5.8 % (ref 1–4)
ERYTHROCYTE [DISTWIDTH] IN BLOOD BY AUTOMATED COUNT: 13.2 % (ref 11.5–14.5)
EST. AVERAGE GLUCOSE BLD GHB EST-MCNC: 103 MG/DL
FERRITIN SERPL-MCNC: 67 NG/ML (ref 8–252)
FOLATE SERPL-MCNC: 12.1 NG/ML (ref 3.1–17.5)
GLOBULIN SER-MCNC: 3.5 G/DL (ref 2–4)
GLUCOSE SERPL-MCNC: 106 MG/DL (ref 74–106)
HBA1C MFR BLD HPLC: 5.2 % (ref 4.5–6.6)
HCT VFR BLD AUTO: 43.7 % (ref 38–47)
HCV AB SER QL: NORMAL
HDLC SERPL-MCNC: 53 MG/DL (ref 40–60)
HGB BLD-MCNC: 13.8 G/DL (ref 12–16)
HIV 1+O+2 AB SERPL QL: NORMAL
IMM GRANULOCYTES # BLD AUTO: 0.02 K/UL (ref 0–0.04)
IMM GRANULOCYTES NFR BLD: 0.7 % (ref 0–0.4)
IRON SATN MFR SERPL: 29 % (ref 14–50)
IRON SERPL-MCNC: 86 ΜG/DL (ref 50–170)
LDLC SERPL CALC-MCNC: 167 MG/DL
LDLC/HDLC SERPL: 3.2 {RATIO}
LYMPHOCYTES # BLD AUTO: 0.74 K/UL (ref 1–4.8)
LYMPHOCYTES NFR BLD AUTO: 26.6 % (ref 27–41)
MCH RBC QN AUTO: 30 PG (ref 27–31)
MCHC RBC AUTO-ENTMCNC: 31.6 G/DL (ref 32–36)
MCV RBC AUTO: 95 FL (ref 80–96)
MONOCYTES # BLD AUTO: 0.4 K/UL (ref 0–0.8)
MONOCYTES NFR BLD AUTO: 14.4 % (ref 2–6)
MPC BLD CALC-MCNC: 11.3 FL (ref 9.4–12.4)
NEUTROPHILS # BLD AUTO: 1.43 K/UL (ref 1.8–7.7)
NEUTROPHILS NFR BLD AUTO: 51.4 % (ref 53–65)
NONHDLC SERPL-MCNC: 195 MG/DL
NRBC # BLD AUTO: 0 X10E3/UL
NRBC, AUTO (.00): 0 %
PLATELET # BLD AUTO: 229 K/UL (ref 150–400)
POTASSIUM SERPL-SCNC: 3.6 MMOL/L (ref 3.5–5.1)
PROT SERPL-MCNC: 6.9 G/DL (ref 6.4–8.2)
RBC # BLD AUTO: 4.6 M/UL (ref 4.2–5.4)
SODIUM SERPL-SCNC: 141 MMOL/L (ref 136–145)
TIBC SERPL-MCNC: 299 ΜG/DL (ref 250–450)
TRIGL SERPL-MCNC: 141 MG/DL (ref 35–150)
VIT B12 SERPL-MCNC: 244 PG/ML (ref 193–986)
VLDLC SERPL-MCNC: 28 MG/DL
WBC # BLD AUTO: 2.78 K/UL (ref 4.5–11)

## 2024-06-19 PROCEDURE — 85025 COMPLETE CBC W/AUTO DIFF WBC: CPT | Mod: ,,, | Performed by: CLINICAL MEDICAL LABORATORY

## 2024-06-19 PROCEDURE — 80053 COMPREHEN METABOLIC PANEL: CPT | Mod: ,,, | Performed by: CLINICAL MEDICAL LABORATORY

## 2024-06-19 PROCEDURE — 83550 IRON BINDING TEST: CPT | Mod: ,,, | Performed by: CLINICAL MEDICAL LABORATORY

## 2024-06-19 PROCEDURE — 87389 HIV-1 AG W/HIV-1&-2 AB AG IA: CPT | Mod: ,,, | Performed by: CLINICAL MEDICAL LABORATORY

## 2024-06-19 PROCEDURE — 86803 HEPATITIS C AB TEST: CPT | Mod: ,,, | Performed by: CLINICAL MEDICAL LABORATORY

## 2024-06-19 PROCEDURE — 83540 ASSAY OF IRON: CPT | Mod: ,,, | Performed by: CLINICAL MEDICAL LABORATORY

## 2024-06-19 PROCEDURE — 1160F RVW MEDS BY RX/DR IN RCRD: CPT | Mod: ,,,

## 2024-06-19 PROCEDURE — 3288F FALL RISK ASSESSMENT DOCD: CPT | Mod: ,,,

## 2024-06-19 PROCEDURE — 82607 VITAMIN B-12: CPT | Mod: ,,, | Performed by: CLINICAL MEDICAL LABORATORY

## 2024-06-19 PROCEDURE — 1125F AMNT PAIN NOTED PAIN PRSNT: CPT | Mod: ,,,

## 2024-06-19 PROCEDURE — 3080F DIAST BP >= 90 MM HG: CPT | Mod: ,,,

## 2024-06-19 PROCEDURE — 82746 ASSAY OF FOLIC ACID SERUM: CPT | Mod: ,,, | Performed by: CLINICAL MEDICAL LABORATORY

## 2024-06-19 PROCEDURE — 3008F BODY MASS INDEX DOCD: CPT | Mod: ,,,

## 2024-06-19 PROCEDURE — 80061 LIPID PANEL: CPT | Mod: ,,, | Performed by: CLINICAL MEDICAL LABORATORY

## 2024-06-19 PROCEDURE — 83036 HEMOGLOBIN GLYCOSYLATED A1C: CPT | Mod: ,,, | Performed by: CLINICAL MEDICAL LABORATORY

## 2024-06-19 PROCEDURE — 1101F PT FALLS ASSESS-DOCD LE1/YR: CPT | Mod: ,,,

## 2024-06-19 PROCEDURE — 1159F MED LIST DOCD IN RCRD: CPT | Mod: ,,,

## 2024-06-19 PROCEDURE — 4010F ACE/ARB THERAPY RXD/TAKEN: CPT | Mod: ,,,

## 2024-06-19 PROCEDURE — 3077F SYST BP >= 140 MM HG: CPT | Mod: ,,,

## 2024-06-19 PROCEDURE — 82728 ASSAY OF FERRITIN: CPT | Mod: ,,, | Performed by: CLINICAL MEDICAL LABORATORY

## 2024-06-19 PROCEDURE — 99214 OFFICE O/P EST MOD 30 MIN: CPT | Mod: ,,,

## 2024-06-19 NOTE — ASSESSMENT & PLAN NOTE
Continue current medication, is effective at this time. Return to the clinic as needed.   -Avoid spicy, greasy foods  -Avoid caffeine, citric acid, chocolate, peppermint, and carbonated drinks  -Do not lay down within 3 hours of eating  -Exercise 150 minutes per week  -Increase fluid to 64 ounces daily  -Avoid antiinflammatory medications such as motrin, advil, aleve, ibuprofen, and BC powder

## 2024-06-19 NOTE — PATIENT INSTRUCTIONS
-Take blood pressure once a day and write down on a blood pressure log. Return to the clinic in one week for a nurse blood pressue check and bring the blood pressure log and give to the nurse.   -Low salt, low sodium diet, less fried foods, more baked foods, more green leafy vegetables, more fruits, less bread  -CBC, CMP today. Will call with results.   -Exercise at least 30-45 minutes a day  -Follow up in 3 months.

## 2024-06-19 NOTE — ASSESSMENT & PLAN NOTE
Hypertension is not controlled due to not taking blood pressure medications. She usually takes Norvasc 5mg and Losartan. She states it is usually controlled. Explained importance of medication adherence. She is going to come back in one week with a blood pressure log and get her blood pressure re checked. She is going home to take her medications today.   -Take blood pressure once a day and write down on a blood pressure log. Return to the clinic in one week for a nurse blood pressue check and bring the blood pressure log and give to the nurse.   -Low salt, low sodium diet, less fried foods, more baked foods, more green leafy vegetables, more fruits, less bread  -CBC, CMP today. Will call with results.   -Exercise at least 30-45 minutes a day  -Follow up in 3 months.

## 2024-06-19 NOTE — PROGRESS NOTES
HPI:   Arlette Cisneros is a pleasant 63 y.o. patient who reports to clinic with complaints of medication refills and lab work. She is here to establish care. She has a past medical history of Hypertension, GERD, hyperlipidemia. She states that her blood pressure is elevated today because she did not take her blood pressure medication and she was nervous to come to a new doctor. I explained the importance of medication adherence every single day and including doctor visits. She states that her blood pressure usually rungs around 140's at home when she takes it. She states she she does eat some salt but not a lot. She has decreased her salt intake. She states she tries to stay active and moving. She does admit to being tired a lot and fatigue a lot. She states she wants to sleep often. She doesn't have a lot of energy. She states that her left shoulder hurts her at night. She stats that they both hurt but her left hurts worse than her right. She states it has been hurting like this for a couple years. She states when she wakes up in the morning they feel very stiff. She states that it gets better throughout the day and night and it mainly hurts when she wakes up in the morning. She states she has gotten shoulder injections in the past by another doctor and that helps. She denies any shortness of breath or chest pain. She denies any other problems. She is not a smoker. She agrees to have her mammogram done. She agrees to have a dexa scan.     Hypertension  This is a chronic problem. The current episode started more than 1 year ago. The problem is unchanged. The problem is controlled. Pertinent negatives include no anxiety, blurred vision, chest pain or palpitations.                Past Medical History:   Diagnosis Date    Anemia     Cervical radiculopathy 12/23/2018    Degenerative joint disease involving multiple joints 12/21/2020    Hypercholesteremia 03/23/2021    Hypertension     Restrictive lung disease  03/23/2021    Temporomandibular joint disorder 04/30/2018       PAST SURGICAL HISTORY:   Past Surgical History:   Procedure Laterality Date    APPENDECTOMY      HYSTERECTOMY         MEDICATIONS:    Current Outpatient Medications:     albuterol (VENTOLIN HFA) 90 mcg/actuation inhaler, Inhale 2 puffs into the lungs every 6 (six) hours as needed for Wheezing. Rescue, Disp: 18 g, Rfl: 5    amLODIPine (NORVASC) 5 MG tablet, Take 1 tablet (5 mg total) by mouth once daily., Disp: 90 tablet, Rfl: 1    famotidine (PEPCID) 40 MG tablet, Take 1 tablet (40 mg total) by mouth once daily., Disp: 90 tablet, Rfl: 1    losartan (COZAAR) 50 MG tablet, Take 1 tablet (50 mg total) by mouth once daily., Disp: 90 tablet, Rfl: 1    omeprazole (PRILOSEC) 20 MG capsule, Take 1 daily, Disp: 90 capsule, Rfl: 1    rosuvastatin (CRESTOR) 10 MG tablet, Take 1 tablet (10 mg total) by mouth every evening., Disp: 90 tablet, Rfl: 1    fluticasone-umeclidin-vilanter (TRELEGY ELLIPTA) 100-62.5-25 mcg DsDv, Inhale 1 puff into the lungs once daily. (Patient not taking: Reported on 6/19/2024), Disp: , Rfl:     ALLERGIES:   Review of patient's allergies indicates:   Allergen Reactions    Codeine sulfate Shortness Of Breath         Review of Systems   Constitutional: Negative.  Negative for activity change, chills and fever.   HENT: Negative.  Negative for drooling and nosebleeds.    Eyes: Negative.  Negative for blurred vision.   Respiratory: Negative.  Negative for chest tightness.    Cardiovascular: Negative.  Negative for chest pain and palpitations.   Gastrointestinal: Negative.    Endocrine: Negative.    Genitourinary: Negative.  Negative for difficulty urinating.   Musculoskeletal:  Positive for arthralgias (shoulders bilateral).   Integumentary:  Negative.   Allergic/Immunologic: Negative.    Neurological: Negative.  Negative for dizziness.   Hematological: Negative.    Psychiatric/Behavioral: Negative.     All other systems reviewed and are  "negative.         Physical Exam  Constitutional:       General: She is not in acute distress.     Appearance: Normal appearance. She is well-developed. She is not ill-appearing.   HENT:      Head: Normocephalic and atraumatic.      Right Ear: Tympanic membrane normal.      Left Ear: Tympanic membrane normal.      Nose: Nose normal.      Mouth/Throat:      Mouth: Mucous membranes are moist.      Pharynx: Oropharynx is clear. No posterior oropharyngeal erythema.   Cardiovascular:      Rate and Rhythm: Normal rate and regular rhythm.      Pulses: Normal pulses.      Heart sounds: Normal heart sounds.   Pulmonary:      Effort: Pulmonary effort is normal. No accessory muscle usage or respiratory distress.      Breath sounds: Normal breath sounds.   Abdominal:      General: Abdomen is flat. Bowel sounds are normal. There is no distension.      Palpations: Abdomen is soft.      Tenderness: There is no abdominal tenderness.   Musculoskeletal:         General: Normal range of motion.      Cervical back: Normal range of motion.   Skin:     General: Skin is warm and dry.      Capillary Refill: Capillary refill takes less than 2 seconds.   Neurological:      Mental Status: She is alert and oriented to person, place, and time. Mental status is at baseline.   Psychiatric:         Mood and Affect: Mood normal.         Speech: Speech normal.         Behavior: Behavior normal. Behavior is cooperative.         Thought Content: Thought content normal.          VITAL SIGNS:   BP (!) 166/100 (BP Location: Left arm, Patient Position: Sitting, BP Method: Medium (Automatic))   Pulse 80   Temp 97.9 °F (36.6 °C) (Oral)   Resp 18   Ht 5' 5.75" (1.67 m)   Wt 51 kg (112 lb 6.4 oz)   SpO2 98%   BMI 18.28 kg/m²       ASSESSMENT/PLAN  1. Hypertension, unspecified type  Assessment & Plan:  Hypertension is not controlled due to not taking blood pressure medications. She usually takes Norvasc 5mg and Losartan. She states it is usually " controlled. Explained importance of medication adherence. She is going to come back in one week with a blood pressure log and get her blood pressure re checked. She is going home to take her medications today.   -Take blood pressure once a day and write down on a blood pressure log. Return to the clinic in one week for a nurse blood pressue check and bring the blood pressure log and give to the nurse.   -Low salt, low sodium diet, less fried foods, more baked foods, more green leafy vegetables, more fruits, less bread  -CBC, CMP today. Will call with results.   -Exercise at least 30-45 minutes a day  -Follow up in 3 months.       Orders:  -     CBC Auto Differential; Future; Expected date: 06/19/2024  -     Comprehensive Metabolic Panel; Future; Expected date: 06/19/2024    2. Elevated blood sugar  Assessment & Plan:  Will get an A1C. Her blood sugar was elevated on previous labs.     Orders:  -     Hemoglobin A1C; Future; Expected date: 06/19/2024    3. Gastroesophageal reflux disease without esophagitis  Assessment & Plan:  Continue current medication, is effective at this time. Return to the clinic as needed.   -Avoid spicy, greasy foods  -Avoid caffeine, citric acid, chocolate, peppermint, and carbonated drinks  -Do not lay down within 3 hours of eating  -Exercise 150 minutes per week  -Increase fluid to 64 ounces daily  -Avoid antiinflammatory medications such as motrin, advil, aleve, ibuprofen, and BC powder        4. Anemia, unspecified type  Assessment & Plan:  Will get CBC today. She is fatigued often and has a history of anemia.     Orders:  -     Iron and TIBC; Future; Expected date: 06/19/2024  -     Ferritin; Future; Expected date: 06/19/2024  -     Vitamin B12 & Folate; Future; Expected date: 06/19/2024    5. Hyperlipidemia, unspecified hyperlipidemia type  Assessment & Plan:  Hyperlipidemia is controlled. Continue current medication, is effective at this time. Return to the clinic as needed.   Low  fat, low chol diet, less fried foods, more baked foods, more vegetables.   Exercise daily  Take medications as ordered for hyperlipidemia  Return to the clinic as needed   Follow up in 6 months   Lipids panel today ,will call with results and adjust medications accordingly.      Orders:  -     Lipid Panel; Future; Expected date: 06/19/2024    6. Fatigue, unspecified type  Assessment & Plan:  CBC, CMP, VIT b12, today.     Orders:  -     Vitamin B12 & Folate; Future; Expected date: 06/19/2024    7. Nutritional anemia, unspecified  Assessment & Plan:  Will get CBC today.     Orders:  -     Vitamin B12 & Folate; Future; Expected date: 06/19/2024    8. Post-menopausal osteoporosis  Assessment & Plan:  Will get Dexa scan. They will call her with an appt soon.     Orders:  -     DXA Bone Density Axial Skeleton 1 or more sites; Future; Expected date: 06/19/2024    9. Screening for HIV (human immunodeficiency virus)  Assessment & Plan:  HIV screening today.     Orders:  -     HIV 1/2 Ag/Ab (4th Gen); Future; Expected date: 06/19/2024    10. Need for hepatitis C screening test  Assessment & Plan:  Hep C screening today.     Orders:  -     Hepatitis C Antibody; Future; Expected date: 06/19/2024    11. Encounter for screening for malignant neoplasm of breast, unspecified screening modality  Assessment & Plan:  Mammogram ordered today. [facility] will call her     Orders:  -     Mammo Digital Screening Bilat w/ Roland; Future; Expected date: 06/19/2024    12. Encounter for screening mammogram for malignant neoplasm of breast  Assessment & Plan:  Mammogram ordered today. [facility] will call her.     Orders:  -     Mammo Digital Screening Bilat w/ Roland; Future; Expected date: 06/19/2024             Patient Instructions   -Take blood pressure once a day and write down on a blood pressure log. Return to the clinic in one week for a nurse blood pressue check and bring the blood pressure log and give to the nurse.   -Low salt, low sodium diet,  less fried foods, more baked foods, more green leafy vegetables, more fruits, less bread  -CBC, CMP today. Will call with results.   -Exercise at least 30-45 minutes a day  -Follow up in 3 months.     Orders Placed This Encounter   Procedures    Mammo Digital Screening Bilat w/ Roland     Standing Status:   Future     Standing Expiration Date:   6/19/2026     Order Specific Question:   May the Radiologist modify the order per protocol to meet the clinical needs of the patient?     Answer:   Yes     Order Specific Question:   Release to patient     Answer:   Immediate    DXA Bone Density Axial Skeleton 1 or more sites     Standing Status:   Future     Standing Expiration Date:   6/19/2027     Order Specific Question:   May the Radiologist modify the order per protocol to meet the clinical needs of the patient?     Answer:   Yes     Order Specific Question:   Release to patient     Answer:   Immediate    Hemoglobin A1C     Standing Status:   Future     Number of Occurrences:   1     Standing Expiration Date:   9/17/2025    CBC Auto Differential     Standing Status:   Future     Number of Occurrences:   1     Standing Expiration Date:   9/17/2025    Comprehensive Metabolic Panel     Standing Status:   Future     Number of Occurrences:   1     Standing Expiration Date:   9/17/2025    Iron and TIBC     Standing Status:   Future     Number of Occurrences:   1     Standing Expiration Date:   9/17/2025    Ferritin     Standing Status:   Future     Number of Occurrences:   1     Standing Expiration Date:   9/17/2025    Lipid Panel     Standing Status:   Future     Number of Occurrences:   1     Standing Expiration Date:   9/17/2025    Vitamin B12 & Folate     Standing Status:   Future     Number of Occurrences:   1     Standing Expiration Date:   9/17/2025    CBC with Differential    HIV 1/2 Ag/Ab (4th Gen)     Standing Status:   Future     Standing Expiration Date:   9/17/2025     Order Specific Question:   Release to patient      Answer:   Immediate    Hepatitis C Antibody     Standing Status:   Future     Standing Expiration Date:   9/17/2025     Order Specific Question:   Release to patient     Answer:   Immediate

## 2024-06-20 DIAGNOSIS — M19.90 ARTHRITIS: Primary | ICD-10-CM

## 2024-06-20 RX ORDER — DICLOFENAC SODIUM 25 MG/1
25 TABLET, DELAYED RELEASE ORAL DAILY
Qty: 30 TABLET | Refills: 3 | Status: SHIPPED | OUTPATIENT
Start: 2024-06-20

## 2024-06-20 NOTE — PROGRESS NOTES
Kidneys look good!   Cholesterol is improving.  A1c is normal  IRON is normal limits.   B12 is 244- so that is normal.   Keep follow up appts.  Please tell her I added Voltaren PO for pain since her kidneys look okay. She requested that yesterday. Tell her to not take any other NSAIDS with that and to drink a lot of water, but it should help her arthritis pain.

## 2024-08-09 DIAGNOSIS — Z71.89 COMPLEX CARE COORDINATION: ICD-10-CM

## 2024-08-27 ENCOUNTER — HOSPITAL ENCOUNTER (OUTPATIENT)
Dept: RADIOLOGY | Facility: HOSPITAL | Age: 64
Discharge: HOME | End: 2024-08-27
Payer: MEDICARE

## 2024-08-27 DIAGNOSIS — M81.0 POST-MENOPAUSAL OSTEOPOROSIS: ICD-10-CM

## 2024-08-27 PROCEDURE — 77080 DXA BONE DENSITY AXIAL: CPT | Mod: TC

## 2024-08-28 DIAGNOSIS — M81.0 AGE-RELATED OSTEOPOROSIS WITHOUT CURRENT PATHOLOGICAL FRACTURE: Primary | ICD-10-CM

## 2024-08-28 NOTE — PROGRESS NOTES
I put a therapy plan in for Prolia. She has osteoporosis with high fracture score, which means if she falls or lifts something to heavy she is at high risk to break a bone. Her t score is -3.8 and it should be way less than that. Please tell her to start taking Calcium and Vitamin D every day. It is very important. She can get the prolia at [facility] every 6 months, it is a shot that she will get once every 6 months but it will help build that bone back that she has lost. Tell her [facility] should be reaching out to her to set up a time to start after her insurance is approved. Tell her to call us back if she doesn't hear from them.     She was also suppose to bring a blood pressure log back in June. Did she get too? I do not see it and the last blood pressure I have on her was elevated, although she did not take her medication before coming.

## 2024-09-05 ENCOUNTER — OFFICE VISIT (OUTPATIENT)
Dept: FAMILY MEDICINE | Facility: CLINIC | Age: 64
End: 2024-09-05
Payer: MEDICARE

## 2024-09-05 VITALS
SYSTOLIC BLOOD PRESSURE: 147 MMHG | DIASTOLIC BLOOD PRESSURE: 93 MMHG | HEART RATE: 90 BPM | TEMPERATURE: 98 F | OXYGEN SATURATION: 96 % | WEIGHT: 107 LBS | RESPIRATION RATE: 20 BRPM | HEIGHT: 65 IN | BODY MASS INDEX: 17.83 KG/M2

## 2024-09-05 DIAGNOSIS — R35.0 URINE FREQUENCY: ICD-10-CM

## 2024-09-05 DIAGNOSIS — R09.81 NASAL CONGESTION: ICD-10-CM

## 2024-09-05 DIAGNOSIS — N39.0 URINARY TRACT INFECTION WITHOUT HEMATURIA, SITE UNSPECIFIED: Primary | ICD-10-CM

## 2024-09-05 LAB
BILIRUB SERPL-MCNC: NORMAL MG/DL
BLOOD URINE, POC: NORMAL
CLARITY, UA: CLEAR
COLOR, UA: YELLOW
CTP QC/QA: YES
CTP QC/QA: YES
GLUCOSE UR QL STRIP: NORMAL
KETONES UR QL STRIP: 15
LEUKOCYTE ESTERASE URINE, POC: NORMAL
NITRITE, POC UA: NORMAL
PH, POC UA: 6
POC MOLECULAR INFLUENZA A AGN: NEGATIVE
POC MOLECULAR INFLUENZA B AGN: NEGATIVE
PROTEIN, POC: 30
SARS-COV-2 RDRP RESP QL NAA+PROBE: NEGATIVE
SPECIFIC GRAVITY, POC UA: 1.03
UROBILINOGEN, POC UA: 2

## 2024-09-05 PROCEDURE — 87077 CULTURE AEROBIC IDENTIFY: CPT | Mod: ,,, | Performed by: CLINICAL MEDICAL LABORATORY

## 2024-09-05 PROCEDURE — 87186 SC STD MICRODIL/AGAR DIL: CPT | Mod: ,,, | Performed by: CLINICAL MEDICAL LABORATORY

## 2024-09-05 PROCEDURE — 87086 URINE CULTURE/COLONY COUNT: CPT | Mod: ,,, | Performed by: CLINICAL MEDICAL LABORATORY

## 2024-09-05 RX ORDER — CHLORPHENIRAMINE MALEATE AND PHENYLEPHRINE HYDROCHLORIDE 4; 10 MG/1; MG/1
1 TABLET, COATED ORAL EVERY 6 HOURS PRN
Qty: 20 TABLET | Refills: 0 | Status: SHIPPED | OUTPATIENT
Start: 2024-09-05 | End: 2024-09-15

## 2024-09-05 RX ORDER — FLUTICASONE PROPIONATE 50 MCG
1 SPRAY, SUSPENSION (ML) NASAL DAILY
Qty: 11.1 ML | Refills: 0 | Status: SHIPPED | OUTPATIENT
Start: 2024-09-05

## 2024-09-05 RX ORDER — NITROFURANTOIN 25; 75 MG/1; MG/1
100 CAPSULE ORAL 2 TIMES DAILY
Qty: 10 CAPSULE | Refills: 0 | Status: SHIPPED | OUTPATIENT
Start: 2024-09-05

## 2024-09-05 RX ORDER — CICLOPIROX 80 MG/ML
SOLUTION TOPICAL NIGHTLY
COMMUNITY
Start: 2024-06-03

## 2024-09-05 NOTE — PROGRESS NOTES
HPI:   Arlette Cisneros is a pleasant 63 y.o. patient who reports to clinic with complaints of nasal congestion: HA, scratchy throat for about two weeks. She also complaints of urinary frequency for about a week or longer. She states it doesn't burn when she urinates but that she feels like she has to go to the bathroom, but she isn't urinating as much as she should, she also states the urine has s strong odor to it. She states at night she thinks she is getting fever because she feel achy. She states she does feel hot feeling and sweat at night. She denies any chest pain or shortness of breath.         Urinary Frequency   This is a new problem. The current episode started 1 to 4 weeks ago. The problem occurs every urination. The problem has been gradually worsening. She is Not sexually active. There is No history of pyelonephritis. Associated symptoms include frequency and urgency. Pertinent negatives include no chills. She has tried nothing for the symptoms. The treatment provided mild relief.                Past Medical History:   Diagnosis Date    Anemia     Cervical radiculopathy 12/23/2018    Degenerative joint disease involving multiple joints 12/21/2020    Hypercholesteremia 03/23/2021    Hypertension     Restrictive lung disease 03/23/2021    Temporomandibular joint disorder 04/30/2018       PAST SURGICAL HISTORY:   Past Surgical History:   Procedure Laterality Date    APPENDECTOMY      HYSTERECTOMY         MEDICATIONS:    Current Outpatient Medications:     albuterol (VENTOLIN HFA) 90 mcg/actuation inhaler, Inhale 2 puffs into the lungs every 6 (six) hours as needed for Wheezing. Rescue, Disp: 18 g, Rfl: 5    amLODIPine (NORVASC) 5 MG tablet, Take 1 tablet (5 mg total) by mouth once daily., Disp: 90 tablet, Rfl: 1    ciclopirox (PENLAC) 8 % Soln, Apply topically every evening., Disp: , Rfl:     losartan (COZAAR) 50 MG tablet, Take 1 tablet (50 mg total) by mouth once daily., Disp: 90 tablet, Rfl: 1     omeprazole (PRILOSEC) 20 MG capsule, Take 1 daily, Disp: 90 capsule, Rfl: 1    rosuvastatin (CRESTOR) 10 MG tablet, Take 1 tablet (10 mg total) by mouth every evening., Disp: 90 tablet, Rfl: 1    chlorpheniramine-phenylephrine (ED A-HIST) 4-10 mg per tablet, Take 1 tablet by mouth every 6 (six) hours as needed for Congestion., Disp: 20 tablet, Rfl: 0    diclofenac (VOLTAREN) 25 MG TbEC, Take 1 tablet (25 mg total) by mouth once daily. (Patient not taking: Reported on 9/5/2024), Disp: 30 tablet, Rfl: 3    famotidine (PEPCID) 40 MG tablet, Take 1 tablet (40 mg total) by mouth once daily., Disp: 90 tablet, Rfl: 1    fluticasone propionate (FLONASE) 50 mcg/actuation nasal spray, 1 spray (50 mcg total) by Each Nostril route once daily., Disp: 11.1 mL, Rfl: 0    fluticasone-umeclidin-vilanter (TRELEGY ELLIPTA) 100-62.5-25 mcg DsDv, Inhale 1 puff into the lungs once daily. (Patient not taking: Reported on 6/19/2024), Disp: , Rfl:     nitrofurantoin, macrocrystal-monohydrate, (MACROBID) 100 MG capsule, Take 1 capsule (100 mg total) by mouth 2 (two) times daily., Disp: 10 capsule, Rfl: 0    ALLERGIES:   Review of patient's allergies indicates:   Allergen Reactions    Codeine sulfate Shortness Of Breath         Review of Systems   Constitutional: Negative.  Negative for activity change, chills and fever.   HENT:  Positive for nasal congestion. Negative for drooling and nosebleeds.    Eyes: Negative.    Respiratory: Negative.  Negative for chest tightness.    Cardiovascular: Negative.  Negative for chest pain and palpitations.   Gastrointestinal: Negative.    Endocrine: Negative.    Genitourinary:  Positive for frequency and urgency. Negative for difficulty urinating.   Musculoskeletal: Negative.    Integumentary:  Negative.   Allergic/Immunologic: Negative.    Neurological: Negative.  Negative for dizziness.   Hematological: Negative.    Psychiatric/Behavioral: Negative.     All other systems reviewed and are negative.    "      Physical Exam  Constitutional:       General: She is not in acute distress.     Appearance: Normal appearance. She is well-developed. She is not ill-appearing.   HENT:      Head: Normocephalic and atraumatic.      Right Ear: Tympanic membrane normal. Tympanic membrane is not erythematous.      Left Ear: Tympanic membrane normal. Tympanic membrane is not erythematous.      Ears:      Comments: Clear fluid behind bilateral TMs     Nose: Nose normal.      Mouth/Throat:      Mouth: Mucous membranes are moist.      Pharynx: Oropharynx is clear. No posterior oropharyngeal erythema.   Cardiovascular:      Rate and Rhythm: Normal rate and regular rhythm.      Pulses: Normal pulses.      Heart sounds: Normal heart sounds.   Pulmonary:      Effort: Pulmonary effort is normal. No accessory muscle usage or respiratory distress.      Breath sounds: Normal breath sounds.   Abdominal:      General: Abdomen is flat. Bowel sounds are normal. There is no distension.      Palpations: Abdomen is soft.      Tenderness: There is no abdominal tenderness.   Musculoskeletal:         General: Normal range of motion.      Cervical back: Normal range of motion.   Skin:     General: Skin is warm and dry.      Capillary Refill: Capillary refill takes less than 2 seconds.   Neurological:      Mental Status: She is alert and oriented to person, place, and time. Mental status is at baseline.   Psychiatric:         Mood and Affect: Mood normal.         Speech: Speech normal.         Behavior: Behavior normal. Behavior is cooperative.         Thought Content: Thought content normal.          VITAL SIGNS:   BP (!) 147/93 (BP Location: Right arm, Patient Position: Sitting, BP Method: Medium (Automatic))   Pulse 90   Temp 97.6 °F (36.4 °C) (Oral)   Resp 20   Ht 5' 5" (1.651 m)   Wt 48.5 kg (107 lb)   SpO2 96%   BMI 17.81 kg/m²       ASSESSMENT/PLAN  1. Urinary tract infection without hematuria, site unspecified  Assessment & " Plan:  -Increase fluid intake.   -Take meds as prescribed.   -Complete all antibiotics until they are gone even if starting to feel better. Take all antibiotic pills until they are gone as prescribed.   -Use preventative measures such as wiping from front to back, urination after sexual intercourse.   -If constipated take stool softener daily if tolerated.    -Will call patient with urine culture results.       Orders:  -     nitrofurantoin, macrocrystal-monohydrate, (MACROBID) 100 MG capsule; Take 1 capsule (100 mg total) by mouth 2 (two) times daily.  Dispense: 10 capsule; Refill: 0    2. Nasal congestion  Assessment & Plan:  Ed a hist as needed  Flonase daily.   Increase fluid intake.     Orders:  -     POCT COVID-19 Rapid Screening  -     POCT Influenza A/B Molecular  -     fluticasone propionate (FLONASE) 50 mcg/actuation nasal spray; 1 spray (50 mcg total) by Each Nostril route once daily.  Dispense: 11.1 mL; Refill: 0  -     chlorpheniramine-phenylephrine (ED A-HIST) 4-10 mg per tablet; Take 1 tablet by mouth every 6 (six) hours as needed for Congestion.  Dispense: 20 tablet; Refill: 0    3. Urine frequency  Assessment & Plan:  See plan for UTI     Orders:  -     POCT URINALYSIS W/O SCOPE  -     Urine culture             Patient Instructions   -Increase fluid intake.   -Take meds as prescribed. (Macrobid 100 mg twice a day for 5 days)  -Complete all antibiotics until they are gone even if starting to feel better. Take all antibiotic pills until they are gone as prescribed.   -Use preventative measures such as wiping from front to back, urination after sexual intercourse.   -If constipated take stool softener daily if tolerated.    -Will call patient with urine culture results.     Orders Placed This Encounter   Procedures    Urine culture          POCT COVID-19 Rapid Screening    POCT Influenza A/B Molecular    POCT URINALYSIS W/O SCOPE

## 2024-09-05 NOTE — ASSESSMENT & PLAN NOTE
-Increase fluid intake.   -Take meds as prescribed.   -Complete all antibiotics until they are gone even if starting to feel better. Take all antibiotic pills until they are gone as prescribed.   -Use preventative measures such as wiping from front to back, urination after sexual intercourse.   -If constipated take stool softener daily if tolerated.    -Will call patient with urine culture results.

## 2024-09-05 NOTE — PATIENT INSTRUCTIONS
-Increase fluid intake.   -Take meds as prescribed. (Macrobid 100 mg twice a day for 5 days)  -Complete all antibiotics until they are gone even if starting to feel better. Take all antibiotic pills until they are gone as prescribed.   -Use preventative measures such as wiping from front to back, urination after sexual intercourse.   -If constipated take stool softener daily if tolerated.    -Will call patient with urine culture results.

## 2024-09-07 LAB — UA COMPLETE W REFLEX CULTURE PNL UR: ABNORMAL

## 2024-09-19 ENCOUNTER — OFFICE VISIT (OUTPATIENT)
Dept: FAMILY MEDICINE | Facility: CLINIC | Age: 64
End: 2024-09-19
Payer: MEDICARE

## 2024-09-19 VITALS
RESPIRATION RATE: 18 BRPM | HEIGHT: 65 IN | SYSTOLIC BLOOD PRESSURE: 159 MMHG | DIASTOLIC BLOOD PRESSURE: 90 MMHG | WEIGHT: 109 LBS | TEMPERATURE: 97 F | OXYGEN SATURATION: 97 % | HEART RATE: 71 BPM | BODY MASS INDEX: 18.16 KG/M2

## 2024-09-19 DIAGNOSIS — K21.9 GASTROESOPHAGEAL REFLUX DISEASE WITHOUT ESOPHAGITIS: ICD-10-CM

## 2024-09-19 DIAGNOSIS — I10 HYPERTENSION, UNSPECIFIED TYPE: Primary | ICD-10-CM

## 2024-09-19 DIAGNOSIS — N39.0 URINARY TRACT INFECTION WITHOUT HEMATURIA, SITE UNSPECIFIED: ICD-10-CM

## 2024-09-19 DIAGNOSIS — R30.0 DYSURIA: ICD-10-CM

## 2024-09-19 DIAGNOSIS — M81.0 OSTEOPOROSIS, UNSPECIFIED OSTEOPOROSIS TYPE, UNSPECIFIED PATHOLOGICAL FRACTURE PRESENCE: ICD-10-CM

## 2024-09-19 DIAGNOSIS — E78.5 HYPERLIPIDEMIA, UNSPECIFIED HYPERLIPIDEMIA TYPE: ICD-10-CM

## 2024-09-19 LAB
ALBUMIN SERPL BCP-MCNC: 3.3 G/DL (ref 3.5–5)
ALBUMIN/GLOB SERPL: 1 {RATIO}
ALP SERPL-CCNC: 106 U/L (ref 55–142)
ALT SERPL W P-5'-P-CCNC: 52 U/L (ref 13–56)
ANION GAP SERPL CALCULATED.3IONS-SCNC: 10 MMOL/L (ref 7–16)
AST SERPL W P-5'-P-CCNC: 47 U/L (ref 15–37)
BILIRUB SERPL-MCNC: 0.5 MG/DL (ref ?–1.2)
BILIRUB SERPL-MCNC: NORMAL MG/DL
BLOOD URINE, POC: NORMAL
BUN SERPL-MCNC: 17 MG/DL (ref 7–18)
BUN/CREAT SERPL: 37 (ref 6–20)
CALCIUM SERPL-MCNC: 9.1 MG/DL (ref 8.5–10.1)
CHLORIDE SERPL-SCNC: 105 MMOL/L (ref 98–107)
CLARITY, UA: CLEAR
CO2 SERPL-SCNC: 28 MMOL/L (ref 21–32)
COLOR, UA: YELLOW
CREAT SERPL-MCNC: 0.46 MG/DL (ref 0.55–1.02)
EGFR (NO RACE VARIABLE) (RUSH/TITUS): 106 ML/MIN/1.73M2
GLOBULIN SER-MCNC: 3.3 G/DL (ref 2–4)
GLUCOSE SERPL-MCNC: 109 MG/DL (ref 74–106)
GLUCOSE UR QL STRIP: NORMAL
KETONES UR QL STRIP: NORMAL
LEUKOCYTE ESTERASE URINE, POC: NORMAL
NITRITE, POC UA: NORMAL
PH, POC UA: 5.5
POTASSIUM SERPL-SCNC: 4.3 MMOL/L (ref 3.5–5.1)
PROT SERPL-MCNC: 6.6 G/DL (ref 6.4–8.2)
PROTEIN, POC: NORMAL
SODIUM SERPL-SCNC: 139 MMOL/L (ref 136–145)
SPECIFIC GRAVITY, POC UA: 1.03
UROBILINOGEN, POC UA: 1

## 2024-09-19 PROCEDURE — 3077F SYST BP >= 140 MM HG: CPT | Mod: ,,,

## 2024-09-19 PROCEDURE — 87186 SC STD MICRODIL/AGAR DIL: CPT | Mod: ,,, | Performed by: CLINICAL MEDICAL LABORATORY

## 2024-09-19 PROCEDURE — 4010F ACE/ARB THERAPY RXD/TAKEN: CPT | Mod: ,,,

## 2024-09-19 PROCEDURE — 3079F DIAST BP 80-89 MM HG: CPT | Mod: ,,,

## 2024-09-19 PROCEDURE — 81003 URINALYSIS AUTO W/O SCOPE: CPT | Mod: QW,,,

## 2024-09-19 PROCEDURE — 1126F AMNT PAIN NOTED NONE PRSNT: CPT | Mod: ,,,

## 2024-09-19 PROCEDURE — 80053 COMPREHEN METABOLIC PANEL: CPT | Mod: ,,, | Performed by: CLINICAL MEDICAL LABORATORY

## 2024-09-19 PROCEDURE — 1159F MED LIST DOCD IN RCRD: CPT | Mod: ,,,

## 2024-09-19 PROCEDURE — 87086 URINE CULTURE/COLONY COUNT: CPT | Mod: ,,, | Performed by: CLINICAL MEDICAL LABORATORY

## 2024-09-19 PROCEDURE — 1101F PT FALLS ASSESS-DOCD LE1/YR: CPT | Mod: ,,,

## 2024-09-19 PROCEDURE — 99214 OFFICE O/P EST MOD 30 MIN: CPT | Mod: ,,,

## 2024-09-19 PROCEDURE — 3044F HG A1C LEVEL LT 7.0%: CPT | Mod: ,,,

## 2024-09-19 PROCEDURE — 87077 CULTURE AEROBIC IDENTIFY: CPT | Mod: ,,, | Performed by: CLINICAL MEDICAL LABORATORY

## 2024-09-19 PROCEDURE — 3288F FALL RISK ASSESSMENT DOCD: CPT | Mod: ,,,

## 2024-09-19 PROCEDURE — 3008F BODY MASS INDEX DOCD: CPT | Mod: ,,,

## 2024-09-19 PROCEDURE — 1160F RVW MEDS BY RX/DR IN RCRD: CPT | Mod: ,,,

## 2024-09-19 RX ORDER — ROSUVASTATIN CALCIUM 10 MG/1
10 TABLET, COATED ORAL NIGHTLY
Qty: 90 TABLET | Refills: 1 | Status: SHIPPED | OUTPATIENT
Start: 2024-09-19

## 2024-09-19 RX ORDER — FAMOTIDINE 40 MG/1
40 TABLET, FILM COATED ORAL DAILY
Qty: 90 TABLET | Refills: 1 | Status: SHIPPED | OUTPATIENT
Start: 2024-09-19

## 2024-09-19 RX ORDER — AMLODIPINE BESYLATE 10 MG/1
10 TABLET ORAL DAILY
Qty: 90 TABLET | Refills: 1 | Status: SHIPPED | OUTPATIENT
Start: 2024-09-19 | End: 2025-09-19

## 2024-09-19 RX ORDER — OMEPRAZOLE 20 MG/1
CAPSULE, DELAYED RELEASE ORAL
Qty: 90 CAPSULE | Refills: 1 | Status: SHIPPED | OUTPATIENT
Start: 2024-09-19

## 2024-09-19 RX ORDER — AMLODIPINE BESYLATE 5 MG/1
5 TABLET ORAL DAILY
Qty: 90 TABLET | Refills: 1 | Status: CANCELLED | OUTPATIENT
Start: 2024-09-19

## 2024-09-19 RX ORDER — LOSARTAN POTASSIUM 50 MG/1
50 TABLET ORAL DAILY
Qty: 90 TABLET | Refills: 1 | Status: SHIPPED | OUTPATIENT
Start: 2024-09-19

## 2024-09-19 RX ORDER — SULFAMETHOXAZOLE AND TRIMETHOPRIM 800; 160 MG/1; MG/1
1 TABLET ORAL 2 TIMES DAILY
Qty: 14 TABLET | Refills: 0 | Status: SHIPPED | OUTPATIENT
Start: 2024-09-19

## 2024-09-19 NOTE — PROGRESS NOTES
HPI:   Arlette Cisneros is a pleasant 63 y.o. patient who reports to clinic with complaints of Medication refill and lab work. She has a past medical history of Hypertension, Hyperlipidemia, GERD, RAD, osteoporosis. She had a UTI a couple weeks ago. She states she is still having a hard time starting to urinate but it is getting better. She states her ears had been hurting last time and feeling full but it is getting better. She denies any chest pain or shortness of breath.                  Past Medical History:   Diagnosis Date    Anemia     Cervical radiculopathy 12/23/2018    Degenerative joint disease involving multiple joints 12/21/2020    Hypercholesteremia 03/23/2021    Hypertension     Restrictive lung disease 03/23/2021    Temporomandibular joint disorder 04/30/2018       PAST SURGICAL HISTORY:   Past Surgical History:   Procedure Laterality Date    APPENDECTOMY      HYSTERECTOMY         MEDICATIONS:    Current Outpatient Medications:     albuterol (VENTOLIN HFA) 90 mcg/actuation inhaler, Inhale 2 puffs into the lungs every 6 (six) hours as needed for Wheezing. Rescue, Disp: 18 g, Rfl: 5    ciclopirox (PENLAC) 8 % Soln, Apply topically every evening., Disp: , Rfl:     fluticasone propionate (FLONASE) 50 mcg/actuation nasal spray, 1 spray (50 mcg total) by Each Nostril route once daily., Disp: 11.1 mL, Rfl: 0    amLODIPine (NORVASC) 10 MG tablet, Take 1 tablet (10 mg total) by mouth once daily., Disp: 90 tablet, Rfl: 1    diclofenac (VOLTAREN) 25 MG TbEC, Take 1 tablet (25 mg total) by mouth once daily. (Patient not taking: Reported on 9/5/2024), Disp: 30 tablet, Rfl: 3    famotidine (PEPCID) 40 MG tablet, Take 1 tablet (40 mg total) by mouth once daily., Disp: 90 tablet, Rfl: 1    fluticasone-umeclidin-vilanter (TRELEGY ELLIPTA) 100-62.5-25 mcg DsDv, Inhale 1 puff into the lungs once daily. (Patient not taking: Reported on 6/19/2024), Disp: , Rfl:     losartan (COZAAR) 50 MG tablet, Take 1 tablet (50 mg  total) by mouth once daily., Disp: 90 tablet, Rfl: 1    omeprazole (PRILOSEC) 20 MG capsule, Take 1 daily, Disp: 90 capsule, Rfl: 1    rosuvastatin (CRESTOR) 10 MG tablet, Take 1 tablet (10 mg total) by mouth every evening., Disp: 90 tablet, Rfl: 1    sulfamethoxazole-trimethoprim 800-160mg (BACTRIM DS) 800-160 mg Tab, Take 1 tablet by mouth 2 (two) times daily., Disp: 14 tablet, Rfl: 0    ALLERGIES:   Review of patient's allergies indicates:   Allergen Reactions    Codeine sulfate Shortness Of Breath         Review of Systems   Constitutional: Negative.  Negative for activity change, chills and fever.   HENT: Negative.  Negative for drooling and nosebleeds.    Eyes: Negative.    Respiratory: Negative.  Negative for chest tightness.    Cardiovascular: Negative.  Negative for chest pain and palpitations.   Gastrointestinal: Negative.    Endocrine: Negative.    Genitourinary:  Positive for dysuria (having trouble urinating, but then she does, it has started getting alot better.) and frequency. Negative for difficulty urinating.   Musculoskeletal: Negative.    Integumentary:  Negative.   Allergic/Immunologic: Negative.    Neurological: Negative.  Negative for dizziness.   Hematological: Negative.    Psychiatric/Behavioral: Negative.     All other systems reviewed and are negative.         Physical Exam  Constitutional:       General: She is not in acute distress.     Appearance: Normal appearance. She is well-developed. She is not ill-appearing.   HENT:      Head: Normocephalic and atraumatic.      Right Ear: Tympanic membrane normal.      Left Ear: Tympanic membrane normal.      Nose: Nose normal.      Mouth/Throat:      Mouth: Mucous membranes are moist.      Pharynx: Oropharynx is clear. No posterior oropharyngeal erythema.   Cardiovascular:      Rate and Rhythm: Normal rate and regular rhythm.      Pulses: Normal pulses.      Heart sounds: Normal heart sounds.   Pulmonary:      Effort: Pulmonary effort is normal.  "No accessory muscle usage or respiratory distress.      Breath sounds: Normal breath sounds.   Abdominal:      General: Abdomen is flat. Bowel sounds are normal. There is no distension.      Palpations: Abdomen is soft.      Tenderness: There is no abdominal tenderness.   Musculoskeletal:         General: Normal range of motion.      Cervical back: Normal range of motion.   Skin:     General: Skin is warm and dry.      Capillary Refill: Capillary refill takes less than 2 seconds.   Neurological:      Mental Status: She is alert and oriented to person, place, and time. Mental status is at baseline.   Psychiatric:         Mood and Affect: Mood normal.         Speech: Speech normal.         Behavior: Behavior normal. Behavior is cooperative.         Thought Content: Thought content normal.          VITAL SIGNS:   BP (!) 159/90 (BP Location: Left arm, Patient Position: Sitting, BP Method: Small (Manual))   Pulse 71   Temp 97.1 °F (36.2 °C)   Resp 18   Ht 5' 5" (1.651 m)   Wt 49.4 kg (109 lb)   SpO2 97%   BMI 18.14 kg/m²       ASSESSMENT/PLAN  1. Hypertension, unspecified type  Assessment & Plan:  Hypertension is not controlled due her taking her blood pressure medicine in just a minute ago. She usually takes Norvasc 5mg and Losartan. We will increase her NORVASC to 10 mg today. She states it is usually controlled around 140/90. Explained importance of medication adherence. She is going to come back in one week with a blood pressure log and get her blood pressure re checked. She is going home to take her medications today.   -Take blood pressure once a day and write down on a blood pressure log. Return to the clinic in one week for a nurse blood pressue check and bring the blood pressure log and give to the nurse.   -Low salt, low sodium diet, less fried foods, more baked foods, more green leafy vegetables, more fruits, less bread  - CMP today. Will call with results.   -Exercise at least 30-45 minutes a " day  -Follow up in 3 months.       Orders:  -     losartan (COZAAR) 50 MG tablet; Take 1 tablet (50 mg total) by mouth once daily.  Dispense: 90 tablet; Refill: 1  -     Comprehensive Metabolic Panel; Future; Expected date: 09/19/2024  -     amLODIPine (NORVASC) 10 MG tablet; Take 1 tablet (10 mg total) by mouth once daily.  Dispense: 90 tablet; Refill: 1    2. Gastroesophageal reflux disease without esophagitis  Assessment & Plan:  Continue current medication, is effective at this time. Return to the clinic as needed.   -Avoid spicy, greasy foods  -Avoid caffeine, citric acid, chocolate, peppermint, and carbonated drinks  -Do not lay down within 3 hours of eating  -Exercise 150 minutes per week  -Increase fluid to 64 ounces daily  -Avoid antiinflammatory medications such as motrin, advil, aleve, ibuprofen, and BC powder      Orders:  -     famotidine (PEPCID) 40 MG tablet; Take 1 tablet (40 mg total) by mouth once daily.  Dispense: 90 tablet; Refill: 1  -     omeprazole (PRILOSEC) 20 MG capsule; Take 1 daily  Dispense: 90 capsule; Refill: 1    3. Hyperlipidemia, unspecified hyperlipidemia type  Assessment & Plan:  Hyperlipidemia is controlled. Continue current medication, is effective at this time. Return to the clinic as needed.   Low fat, low chol diet, less fried foods, more baked foods, more vegetables.   Exercise daily  Take medications as ordered for hyperlipidemia  Return to the clinic as needed   Follow up in 6 months   Lipids panel today ,will call with results and adjust medications accordingly.      Orders:  -     rosuvastatin (CRESTOR) 10 MG tablet; Take 1 tablet (10 mg total) by mouth every evening.  Dispense: 90 tablet; Refill: 1    4. Dysuria  Assessment & Plan:  Clean perineal area from the front to back after using the bathroom, clean thoroughly, increase water intake to 64 oz per day, avoid sodas, meds as ordered, return to clinic as scheduled  UA ordered today-  Urine culture  today-      Orders:  -     POCT URINALYSIS W/O SCOPE  -     Urine culture    5. Osteoporosis, unspecified osteoporosis type, unspecified pathological fracture presence  Assessment & Plan:  She will start Prolia next week. I did get a Calcium level on her today. Explained to her to be taking Calcium and Vitamin D. I also explained to her to be careful walking and moving due to her being at increased risk for a fracture. She voiced understanding.    Orders:  -     Calcium; Future; Expected date: 09/19/2024    6. Urinary tract infection without hematuria, site unspecified  Assessment & Plan:  Repeated UA today. + for nitrates, pt is still having s/s.   Will try bactrim. It was sensitive on her culture from 9/5 (instructed her to drink a lot of water)  Urine culture ordered  -Increase fluid intake.   -Take meds as prescribed.   -Complete all antibiotics until they are gone even if starting to feel better. Take all antibiotic pills until they are gone as prescribed.   -Use preventative measures such as wiping from front to back, urination after sexual intercourse.   -If constipated take stool softener daily if tolerated.    -Will call patient with urine culture results.     9/5/2024  Treated with Macrobid sensitive on culture.  -Increase fluid intake.   -Take meds as prescribed.   -Complete all antibiotics until they are gone even if starting to feel better. Take all antibiotic pills until they are gone as prescribed.   -Use preventative measures such as wiping from front to back, urination after sexual intercourse.   -If constipated take stool softener daily if tolerated.    -Will call patient with urine culture results.       Orders:  -     sulfamethoxazole-trimethoprim 800-160mg (BACTRIM DS) 800-160 mg Tab; Take 1 tablet by mouth 2 (two) times daily.  Dispense: 14 tablet; Refill: 0             There are no Patient Instructions on file for this visit.  Orders Placed This Encounter   Procedures    Urine culture           Comprehensive Metabolic Panel     Standing Status:   Future     Number of Occurrences:   1     Standing Expiration Date:   12/18/2025     Order Specific Question:   Send normal result to authorizing provider's In Basket if patient is active on MyChart:     Answer:   Yes    Calcium     Standing Status:   Future     Number of Occurrences:   1     Standing Expiration Date:   12/18/2025    POCT URINALYSIS W/O SCOPE

## 2024-09-19 NOTE — ASSESSMENT & PLAN NOTE
Clean perineal area from the front to back after using the bathroom, clean thoroughly, increase water intake to 64 oz per day, avoid sodas, meds as ordered, return to clinic as scheduled  UA ordered today-  Urine culture today-

## 2024-09-19 NOTE — ASSESSMENT & PLAN NOTE
Repeated UA today. + for nitrates, pt is still having s/s.   Will try bactrim. It was sensitive on her culture from 9/5 (instructed her to drink a lot of water)  Urine culture ordered  -Increase fluid intake.   -Take meds as prescribed.   -Complete all antibiotics until they are gone even if starting to feel better. Take all antibiotic pills until they are gone as prescribed.   -Use preventative measures such as wiping from front to back, urination after sexual intercourse.   -If constipated take stool softener daily if tolerated.    -Will call patient with urine culture results.     9/5/2024  Treated with Macrobid sensitive on culture.  -Increase fluid intake.   -Take meds as prescribed.   -Complete all antibiotics until they are gone even if starting to feel better. Take all antibiotic pills until they are gone as prescribed.   -Use preventative measures such as wiping from front to back, urination after sexual intercourse.   -If constipated take stool softener daily if tolerated.    -Will call patient with urine culture results.

## 2024-09-19 NOTE — ASSESSMENT & PLAN NOTE
She will start Prolia next week. I did get a Calcium level on her today. Explained to her to be taking Calcium and Vitamin D. I also explained to her to be careful walking and moving due to her being at increased risk for a fracture. She voiced understanding.

## 2024-09-19 NOTE — ASSESSMENT & PLAN NOTE
Hypertension is not controlled due her taking her blood pressure medicine in just a minute ago. She usually takes Norvasc 5mg and Losartan. We will increase her NORVASC to 10 mg today. She states it is usually controlled around 140/90. Explained importance of medication adherence. She is going to come back in one week with a blood pressure log and get her blood pressure re checked. She is going home to take her medications today.   -Take blood pressure once a day and write down on a blood pressure log. Return to the clinic in one week for a nurse blood pressue check and bring the blood pressure log and give to the nurse.   -Low salt, low sodium diet, less fried foods, more baked foods, more green leafy vegetables, more fruits, less bread  - CMP today. Will call with results.   -Exercise at least 30-45 minutes a day  -Follow up in 3 months.

## 2024-09-21 LAB — UA COMPLETE W REFLEX CULTURE PNL UR: ABNORMAL

## 2024-09-25 ENCOUNTER — INFUSION (OUTPATIENT)
Dept: INFUSION THERAPY | Facility: HOSPITAL | Age: 64
End: 2024-09-25
Payer: MEDICARE

## 2024-09-25 VITALS
DIASTOLIC BLOOD PRESSURE: 82 MMHG | SYSTOLIC BLOOD PRESSURE: 126 MMHG | WEIGHT: 107.63 LBS | OXYGEN SATURATION: 96 % | RESPIRATION RATE: 18 BRPM | BODY MASS INDEX: 17.91 KG/M2

## 2024-09-25 DIAGNOSIS — M81.0 AGE-RELATED OSTEOPOROSIS WITHOUT CURRENT PATHOLOGICAL FRACTURE: Primary | ICD-10-CM

## 2024-09-25 PROCEDURE — 63600175 PHARM REV CODE 636 W HCPCS: Mod: JZ,TB

## 2024-09-25 PROCEDURE — 96372 THER/PROPH/DIAG INJ SC/IM: CPT

## 2024-09-25 RX ADMIN — DENOSUMAB 60 MG: 60 INJECTION SUBCUTANEOUS at 08:09

## 2024-09-25 NOTE — PROGRESS NOTES
0835  Patient in room 1.  Calcium level within last 6 days was 9.1.  Patient stated she is picking up calcium with vitamin d today to start taking. Instructed patient on side effects and gave patient a brochure on prolia.     0847 Administered Prolia 60mg sq to right upper arm per protocol.  Patient tolerated well.     0907 No adverse reactions noted at this time.  Patient discharged to home ambulatory with appt to return in 6 months for next prolia injection.

## 2025-01-22 ENCOUNTER — PATIENT OUTREACH (OUTPATIENT)
Facility: HOSPITAL | Age: 65
End: 2025-01-22
Payer: MEDICARE

## 2025-01-22 DIAGNOSIS — Z12.31 ENCOUNTER FOR SCREENING MAMMOGRAM FOR MALIGNANT NEOPLASM OF BREAST: Primary | ICD-10-CM

## 2025-01-22 NOTE — PROGRESS NOTES
Population Health Chart Review & Patient Outreach Details    Updates Requested / Reviewed:  [x]  Care Team Updated    Health Maintenance Topics Addressed and Outreach Outcomes / Actions Taken:  Breast Cancer Screening [x] Mammo scheduled at [facility]

## 2025-02-12 ENCOUNTER — OFFICE VISIT (OUTPATIENT)
Dept: FAMILY MEDICINE | Facility: CLINIC | Age: 65
End: 2025-02-12
Payer: MEDICARE

## 2025-02-12 VITALS
WEIGHT: 108.38 LBS | DIASTOLIC BLOOD PRESSURE: 80 MMHG | OXYGEN SATURATION: 97 % | TEMPERATURE: 98 F | HEART RATE: 88 BPM | HEIGHT: 65 IN | SYSTOLIC BLOOD PRESSURE: 139 MMHG | BODY MASS INDEX: 18.06 KG/M2 | RESPIRATION RATE: 20 BRPM

## 2025-02-12 DIAGNOSIS — I10 HYPERTENSION, UNSPECIFIED TYPE: Primary | ICD-10-CM

## 2025-02-12 DIAGNOSIS — K21.9 GASTROESOPHAGEAL REFLUX DISEASE WITHOUT ESOPHAGITIS: ICD-10-CM

## 2025-02-12 DIAGNOSIS — R06.02 SOB (SHORTNESS OF BREATH): ICD-10-CM

## 2025-02-12 DIAGNOSIS — R09.81 NASAL CONGESTION: ICD-10-CM

## 2025-02-12 DIAGNOSIS — E78.5 HYPERLIPIDEMIA, UNSPECIFIED HYPERLIPIDEMIA TYPE: ICD-10-CM

## 2025-02-12 DIAGNOSIS — M19.90 ARTHRITIS: ICD-10-CM

## 2025-02-12 LAB
ALBUMIN SERPL BCP-MCNC: 3.6 G/DL (ref 3.4–4.8)
ALBUMIN/GLOB SERPL: 1.1 {RATIO}
ALP SERPL-CCNC: 73 U/L (ref 40–150)
ALT SERPL W P-5'-P-CCNC: 30 U/L
ANION GAP SERPL CALCULATED.3IONS-SCNC: 14 MMOL/L (ref 7–16)
AST SERPL W P-5'-P-CCNC: 51 U/L (ref 5–34)
BASOPHILS # BLD AUTO: 0.03 K/UL (ref 0–0.2)
BASOPHILS NFR BLD AUTO: 0.9 % (ref 0–1)
BILIRUB SERPL-MCNC: 0.6 MG/DL
BUN SERPL-MCNC: 16 MG/DL (ref 10–20)
BUN/CREAT SERPL: 27 (ref 6–20)
CALCIUM SERPL-MCNC: 9 MG/DL (ref 8.4–10.2)
CHLORIDE SERPL-SCNC: 107 MMOL/L (ref 98–107)
CHOLEST SERPL-MCNC: 268 MG/DL
CHOLEST/HDLC SERPL: 5.4 {RATIO}
CO2 SERPL-SCNC: 25 MMOL/L (ref 23–31)
CREAT SERPL-MCNC: 0.59 MG/DL (ref 0.55–1.02)
DIFFERENTIAL METHOD BLD: ABNORMAL
EGFR (NO RACE VARIABLE) (RUSH/TITUS): 100 ML/MIN/1.73M2
EOSINOPHIL # BLD AUTO: 0.15 K/UL (ref 0–0.5)
EOSINOPHIL NFR BLD AUTO: 4.4 % (ref 1–4)
ERYTHROCYTE [DISTWIDTH] IN BLOOD BY AUTOMATED COUNT: 13.4 % (ref 11.5–14.5)
GLOBULIN SER-MCNC: 3.4 G/DL (ref 2–4)
GLUCOSE SERPL-MCNC: 100 MG/DL (ref 82–115)
HCT VFR BLD AUTO: 47.5 % (ref 38–47)
HDLC SERPL-MCNC: 50 MG/DL (ref 35–60)
HGB BLD-MCNC: 14.8 G/DL (ref 12–16)
IMM GRANULOCYTES # BLD AUTO: 0.02 K/UL (ref 0–0.04)
IMM GRANULOCYTES NFR BLD: 0.6 % (ref 0–0.4)
LDLC SERPL CALC-MCNC: 187 MG/DL
LDLC/HDLC SERPL: 3.7 {RATIO}
LYMPHOCYTES # BLD AUTO: 0.74 K/UL (ref 1–4.8)
LYMPHOCYTES NFR BLD AUTO: 21.8 % (ref 27–41)
MCH RBC QN AUTO: 29.5 PG (ref 27–31)
MCHC RBC AUTO-ENTMCNC: 31.2 G/DL (ref 32–36)
MCV RBC AUTO: 94.6 FL (ref 80–96)
MONOCYTES # BLD AUTO: 0.41 K/UL (ref 0–0.8)
MONOCYTES NFR BLD AUTO: 12.1 % (ref 2–6)
MPC BLD CALC-MCNC: 11.2 FL (ref 9.4–12.4)
NEUTROPHILS # BLD AUTO: 2.05 K/UL (ref 1.8–7.7)
NEUTROPHILS NFR BLD AUTO: 60.2 % (ref 53–65)
NONHDLC SERPL-MCNC: 218 MG/DL
NRBC # BLD AUTO: 0 X10E3/UL
NRBC, AUTO (.00): 0 %
PLATELET # BLD AUTO: 221 K/UL (ref 150–400)
POTASSIUM SERPL-SCNC: 4.1 MMOL/L (ref 3.5–5.1)
PROT SERPL-MCNC: 7 G/DL (ref 5.8–7.6)
RBC # BLD AUTO: 5.02 M/UL (ref 4.2–5.4)
SODIUM SERPL-SCNC: 142 MMOL/L (ref 136–145)
TRIGL SERPL-MCNC: 153 MG/DL (ref 37–140)
VLDLC SERPL-MCNC: 31 MG/DL
WBC # BLD AUTO: 3.4 K/UL (ref 4.5–11)

## 2025-02-12 PROCEDURE — 99214 OFFICE O/P EST MOD 30 MIN: CPT | Mod: ,,,

## 2025-02-12 PROCEDURE — 1159F MED LIST DOCD IN RCRD: CPT | Mod: ,,,

## 2025-02-12 PROCEDURE — 3075F SYST BP GE 130 - 139MM HG: CPT | Mod: ,,,

## 2025-02-12 PROCEDURE — 1101F PT FALLS ASSESS-DOCD LE1/YR: CPT | Mod: ,,,

## 2025-02-12 PROCEDURE — 80061 LIPID PANEL: CPT | Mod: ,,, | Performed by: CLINICAL MEDICAL LABORATORY

## 2025-02-12 PROCEDURE — 3008F BODY MASS INDEX DOCD: CPT | Mod: ,,,

## 2025-02-12 PROCEDURE — 3288F FALL RISK ASSESSMENT DOCD: CPT | Mod: ,,,

## 2025-02-12 PROCEDURE — 3079F DIAST BP 80-89 MM HG: CPT | Mod: ,,,

## 2025-02-12 PROCEDURE — 85025 COMPLETE CBC W/AUTO DIFF WBC: CPT | Mod: ,,, | Performed by: CLINICAL MEDICAL LABORATORY

## 2025-02-12 PROCEDURE — 1160F RVW MEDS BY RX/DR IN RCRD: CPT | Mod: ,,,

## 2025-02-12 PROCEDURE — 1125F AMNT PAIN NOTED PAIN PRSNT: CPT | Mod: ,,,

## 2025-02-12 PROCEDURE — 80053 COMPREHEN METABOLIC PANEL: CPT | Mod: ,,, | Performed by: CLINICAL MEDICAL LABORATORY

## 2025-02-12 RX ORDER — FAMOTIDINE 40 MG/1
40 TABLET, FILM COATED ORAL DAILY
Qty: 90 TABLET | Refills: 1 | Status: SHIPPED | OUTPATIENT
Start: 2025-02-12

## 2025-02-12 RX ORDER — OMEPRAZOLE 20 MG/1
CAPSULE, DELAYED RELEASE ORAL
Qty: 90 CAPSULE | Refills: 1 | Status: SHIPPED | OUTPATIENT
Start: 2025-02-12 | End: 2025-02-12

## 2025-02-12 RX ORDER — DICLOFENAC SODIUM 25 MG/1
25 TABLET, DELAYED RELEASE ORAL DAILY
Qty: 30 TABLET | Refills: 3 | Status: SHIPPED | OUTPATIENT
Start: 2025-02-12

## 2025-02-12 RX ORDER — FLUTICASONE PROPIONATE 50 MCG
1 SPRAY, SUSPENSION (ML) NASAL DAILY
Qty: 11.1 ML | Refills: 0 | Status: SHIPPED | OUTPATIENT
Start: 2025-02-12

## 2025-02-12 RX ORDER — LOSARTAN POTASSIUM 50 MG/1
50 TABLET ORAL DAILY
Qty: 90 TABLET | Refills: 1 | Status: SHIPPED | OUTPATIENT
Start: 2025-02-12

## 2025-02-12 RX ORDER — ALBUTEROL SULFATE 90 UG/1
2 INHALANT RESPIRATORY (INHALATION) EVERY 6 HOURS PRN
Qty: 18 G | Refills: 5 | Status: SHIPPED | OUTPATIENT
Start: 2025-02-12

## 2025-02-12 RX ORDER — AMLODIPINE BESYLATE 10 MG/1
10 TABLET ORAL DAILY
Qty: 90 TABLET | Refills: 1 | Status: SHIPPED | OUTPATIENT
Start: 2025-02-12 | End: 2026-02-12

## 2025-02-12 RX ORDER — ROSUVASTATIN CALCIUM 10 MG/1
10 TABLET, COATED ORAL NIGHTLY
Qty: 90 TABLET | Refills: 1 | Status: SHIPPED | OUTPATIENT
Start: 2025-02-12 | End: 2025-02-13

## 2025-02-12 NOTE — PROGRESS NOTES
HPI:   Arlette Cisneros is a pleasant 64 y.o. patient who reports to clinic with complaints of Medication refill and lab work. She is here for 6 month follow up. She denies any new problems. She denies any shortness of breath or chest pain. She did not bring her medication bottles today. I did explain to her to please bring them to the next visit, as she is unsure at times about her medicine. C scope declined. Mammogram is scheduled for feb.                  Past Medical History:   Diagnosis Date    Anemia     Cervical radiculopathy 12/23/2018    Degenerative joint disease involving multiple joints 12/21/2020    Hypercholesteremia 03/23/2021    Hypertension     Restrictive lung disease 03/23/2021    Temporomandibular joint disorder 04/30/2018       PAST SURGICAL HISTORY:   Past Surgical History:   Procedure Laterality Date    APPENDECTOMY      HYSTERECTOMY         MEDICATIONS:    Current Outpatient Medications:     ciclopirox (PENLAC) 8 % Soln, Apply topically every evening., Disp: , Rfl:     albuterol (VENTOLIN HFA) 90 mcg/actuation inhaler, Inhale 2 puffs into the lungs every 6 (six) hours as needed for Wheezing. Rescue, Disp: 18 g, Rfl: 5    amLODIPine (NORVASC) 10 MG tablet, Take 1 tablet (10 mg total) by mouth once daily., Disp: 90 tablet, Rfl: 1    diclofenac (VOLTAREN) 25 MG TbEC, Take 1 tablet (25 mg total) by mouth once daily., Disp: 30 tablet, Rfl: 3    famotidine (PEPCID) 40 MG tablet, Take 1 tablet (40 mg total) by mouth once daily., Disp: 90 tablet, Rfl: 1    fluticasone propionate (FLONASE) 50 mcg/actuation nasal spray, 1 spray (50 mcg total) by Each Nostril route once daily., Disp: 11.1 mL, Rfl: 0    fluticasone-umeclidin-vilanter (TRELEGY ELLIPTA) 100-62.5-25 mcg DsDv, Inhale 1 puff into the lungs once daily. (Patient not taking: Reported on 2/12/2025), Disp: , Rfl:     losartan (COZAAR) 50 MG tablet, Take 1 tablet (50 mg total) by mouth once daily., Disp: 90 tablet, Rfl: 1    rosuvastatin (CRESTOR)  10 MG tablet, Take 1 tablet (10 mg total) by mouth every evening., Disp: 90 tablet, Rfl: 1    sulfamethoxazole-trimethoprim 800-160mg (BACTRIM DS) 800-160 mg Tab, Take 1 tablet by mouth 2 (two) times daily. (Patient not taking: Reported on 2/12/2025), Disp: 14 tablet, Rfl: 0    ALLERGIES:   Review of patient's allergies indicates:   Allergen Reactions    Codeine sulfate Shortness Of Breath         Review of Systems   Constitutional: Negative.  Negative for activity change, fatigue and fever.   HENT: Negative.  Negative for drooling and nosebleeds.    Eyes: Negative.    Respiratory: Negative.  Negative for chest tightness.    Cardiovascular: Negative.    Gastrointestinal: Negative.    Endocrine: Negative.    Genitourinary: Negative.  Negative for difficulty urinating.   Musculoskeletal: Negative.    Integumentary:  Negative.   Allergic/Immunologic: Negative.    Neurological: Negative.  Negative for dizziness.   Hematological: Negative.    Psychiatric/Behavioral: Negative.     All other systems reviewed and are negative.         Physical Exam  Constitutional:       General: She is not in acute distress.     Appearance: Normal appearance. She is well-developed. She is not ill-appearing.   HENT:      Head: Normocephalic and atraumatic.      Right Ear: Tympanic membrane normal.      Left Ear: Tympanic membrane normal.      Nose: Nose normal.      Mouth/Throat:      Mouth: Mucous membranes are moist.      Pharynx: Oropharynx is clear. No posterior oropharyngeal erythema.   Cardiovascular:      Rate and Rhythm: Normal rate and regular rhythm.      Pulses: Normal pulses.      Heart sounds: Normal heart sounds.   Pulmonary:      Effort: Pulmonary effort is normal. No accessory muscle usage or respiratory distress.      Breath sounds: Normal breath sounds.   Abdominal:      General: Abdomen is flat. Bowel sounds are normal. There is no distension.      Palpations: Abdomen is soft.      Tenderness: There is no abdominal  "tenderness.   Musculoskeletal:         General: Normal range of motion.      Cervical back: Normal range of motion.   Skin:     General: Skin is warm and dry.      Capillary Refill: Capillary refill takes less than 2 seconds.   Neurological:      Mental Status: She is alert and oriented to person, place, and time. Mental status is at baseline.   Psychiatric:         Mood and Affect: Mood normal.         Speech: Speech normal.         Behavior: Behavior normal. Behavior is cooperative.         Thought Content: Thought content normal.          VITAL SIGNS:   /80 (BP Location: Right arm, Patient Position: Sitting)   Pulse 88   Temp 97.5 °F (36.4 °C) (Oral)   Resp 20   Ht 5' 5" (1.651 m)   Wt 49.2 kg (108 lb 6.4 oz)   SpO2 97%   BMI 18.04 kg/m²       ASSESSMENT/PLAN  1. Hypertension, unspecified type  Assessment & Plan:  Hypertension is controlled today. She usually takes Norvasc 10mg and Losartan. We increased her NORVASC to 10 mg on last visit. Blood pressure is much improved. She states it is usually controlled. Explained importance of medication adherence.   -Low salt, low sodium diet, less fried foods, more baked foods, more green leafy vegetables, more fruits, less bread  - CMP today. Will call with results.   -Exercise at least 30-45 minutes a day  -Follow up in 6 months.       Orders:  -     CBC Auto Differential; Future; Expected date: 02/12/2025  -     Comprehensive Metabolic Panel; Future; Expected date: 02/12/2025  -     amLODIPine (NORVASC) 10 MG tablet; Take 1 tablet (10 mg total) by mouth once daily.  Dispense: 90 tablet; Refill: 1  -     losartan (COZAAR) 50 MG tablet; Take 1 tablet (50 mg total) by mouth once daily.  Dispense: 90 tablet; Refill: 1    2. SOB (shortness of breath)  Assessment & Plan:  Patient currently using Trelegy and Ventolin with great improvement in breathing     Orders:  -     albuterol (VENTOLIN HFA) 90 mcg/actuation inhaler; Inhale 2 puffs into the lungs every 6 (six) " hours as needed for Wheezing. Rescue  Dispense: 18 g; Refill: 5    3. Arthritis  Assessment & Plan:  Continue current medication, is effective at this time. Return to the clinic as needed.       Orders:  -     diclofenac (VOLTAREN) 25 MG TbEC; Take 1 tablet (25 mg total) by mouth once daily.  Dispense: 30 tablet; Refill: 3    4. Gastroesophageal reflux disease without esophagitis  Assessment & Plan:  Continue current medication, is effective at this time. Return to the clinic as needed.   -Avoid spicy, greasy foods  -Avoid caffeine, citric acid, chocolate, peppermint, and carbonated drinks  -Do not lay down within 3 hours of eating  -Exercise 150 minutes per week  -Increase fluid to 64 ounces daily  -Avoid antiinflammatory medications such as motrin, advil, aleve, ibuprofen, and BC powder      Orders:  -     famotidine (PEPCID) 40 MG tablet; Take 1 tablet (40 mg total) by mouth once daily.  Dispense: 90 tablet; Refill: 1  -     Discontinue: omeprazole (PRILOSEC) 20 MG capsule; Take 1 daily  Dispense: 90 capsule; Refill: 1    5. Nasal congestion  Assessment & Plan:  Flonase as needed     Orders:  -     fluticasone propionate (FLONASE) 50 mcg/actuation nasal spray; 1 spray (50 mcg total) by Each Nostril route once daily.  Dispense: 11.1 mL; Refill: 0    6. Hyperlipidemia, unspecified hyperlipidemia type  Assessment & Plan:  Hyperlipidemia is controlled. Continue current medication, is effective at this time. Return to the clinic as needed.   Low fat, low chol diet, less fried foods, more baked foods, more vegetables.   Exercise daily  Take medications as ordered for hyperlipidemia  Return to the clinic as needed   Follow up in 6 months   Lipids panel today ,will call with results and adjust medications accordingly.      Orders:  -     CBC Auto Differential; Future; Expected date: 02/12/2025  -     Comprehensive Metabolic Panel; Future; Expected date: 02/12/2025  -     Lipid Panel; Future; Expected date: 02/12/2025  -      rosuvastatin (CRESTOR) 10 MG tablet; Take 1 tablet (10 mg total) by mouth every evening.  Dispense: 90 tablet; Refill: 1             There are no Patient Instructions on file for this visit.  Orders Placed This Encounter   Procedures    CBC Auto Differential     Standing Status:   Future     Number of Occurrences:   1     Standing Expiration Date:   5/12/2026     Order Specific Question:   Send normal result to authorizing provider's In Basket if patient is active on MyChart:     Answer:   Yes    Comprehensive Metabolic Panel     Standing Status:   Future     Number of Occurrences:   1     Standing Expiration Date:   5/12/2026     Order Specific Question:   Send normal result to authorizing provider's In Basket if patient is active on MyChart:     Answer:   Yes    Lipid Panel     Standing Status:   Future     Number of Occurrences:   1     Standing Expiration Date:   5/12/2026     Order Specific Question:   Send normal result to authorizing provider's In Basket if patient is active on MyChart:     Answer:   Yes    CBC with Differential     Order Specific Question:   Send normal result to authorizing provider's In Basket if patient is active on MyChart:     Answer:   Yes

## 2025-02-12 NOTE — ASSESSMENT & PLAN NOTE
Hypertension is controlled today. She usually takes Norvasc 10mg and Losartan. We increased her NORVASC to 10 mg on last visit. Blood pressure is much improved. She states it is usually controlled. Explained importance of medication adherence.   -Low salt, low sodium diet, less fried foods, more baked foods, more green leafy vegetables, more fruits, less bread  - CMP today. Will call with results.   -Exercise at least 30-45 minutes a day  -Follow up in 6 months.

## 2025-02-12 NOTE — Clinical Note
TETANUS VACCINE Never done declines Mammogram Never done Declines Pneumococcal Vaccines (Age 50+)(1 of 2 - PCV) Never done declines Shingles Vaccine(1 of 2) Never done declines RSV Vaccine (Age 60+ and Pregnant patients)(1 - Risk 60-74 years 1-dose series) Never done declines Colorectal Cancer Screening due on 09/09/2023 declines COVID-19 Vaccine(1 - 2024-25 season) Never done declines

## 2025-02-13 DIAGNOSIS — E78.5 HYPERLIPIDEMIA, UNSPECIFIED HYPERLIPIDEMIA TYPE: Primary | ICD-10-CM

## 2025-02-13 RX ORDER — ROSUVASTATIN CALCIUM 20 MG/1
20 TABLET, COATED ORAL DAILY
Qty: 90 TABLET | Refills: 1 | Status: SHIPPED | OUTPATIENT
Start: 2025-02-13 | End: 2026-02-13

## 2025-02-13 NOTE — PROGRESS NOTES
AST is elevated at 51, liver enzyme. Does she drink alcohol? If so, stop. If not then we will continue to monitor it. It could be related to statin drug she is on as well, it was a little elevated four months ago and a little more now. We will repeat this at next appointment, we will monitor.     Cholesterol is elevated- I'm not sure she was fasting? Triglycerides are slightly elevated at 153, cholesterol is elevated at 268, LDL (bad cholesterol is high) at 187, it is suppose to be below or around 100. We will increase her Crestor to 20 mg from 10 mg and eat a low fat diet. Sent to her pharmacy.

## 2025-03-27 ENCOUNTER — INFUSION (OUTPATIENT)
Dept: INFUSION THERAPY | Facility: HOSPITAL | Age: 65
End: 2025-03-27
Payer: MEDICARE

## 2025-03-27 VITALS — HEIGHT: 67 IN | BODY MASS INDEX: 16.61 KG/M2 | WEIGHT: 105.81 LBS

## 2025-03-27 DIAGNOSIS — M81.0 AGE-RELATED OSTEOPOROSIS WITHOUT CURRENT PATHOLOGICAL FRACTURE: Primary | ICD-10-CM

## 2025-03-27 LAB — CALCIUM SERPL-MCNC: 9.3 MG/DL (ref 8.4–10.2)

## 2025-03-27 PROCEDURE — 36415 COLL VENOUS BLD VENIPUNCTURE: CPT

## 2025-03-27 PROCEDURE — 96372 THER/PROPH/DIAG INJ SC/IM: CPT

## 2025-03-27 PROCEDURE — 82310 ASSAY OF CALCIUM: CPT

## 2025-03-27 PROCEDURE — 63600175 PHARM REV CODE 636 W HCPCS: Mod: JZ,TB

## 2025-03-27 RX ADMIN — DENOSUMAB 60 MG: 60 INJECTION SUBCUTANEOUS at 09:03

## 2025-03-27 NOTE — PROGRESS NOTES
0845 patient in room 1. Calcium level today was   9.3    0921 administered Prolia 60mg subq to left upper arm per protocol. No signs or symptoms of adverse effects noted.     0941 patient discharged to home. Ambulatory with appointment in hand to return in 6 months for next Prolia injection.

## 2025-04-01 ENCOUNTER — OFFICE VISIT (OUTPATIENT)
Dept: FAMILY MEDICINE | Facility: CLINIC | Age: 65
End: 2025-04-01
Payer: MEDICARE

## 2025-04-01 DIAGNOSIS — R63.0 DECREASED APPETITE: Primary | ICD-10-CM

## 2025-04-01 DIAGNOSIS — R63.4 WEIGHT LOSS: ICD-10-CM

## 2025-04-01 PROCEDURE — 1160F RVW MEDS BY RX/DR IN RCRD: CPT | Mod: ,,,

## 2025-04-01 PROCEDURE — 1101F PT FALLS ASSESS-DOCD LE1/YR: CPT | Mod: ,,,

## 2025-04-01 PROCEDURE — 3077F SYST BP >= 140 MM HG: CPT | Mod: ,,,

## 2025-04-01 PROCEDURE — 1125F AMNT PAIN NOTED PAIN PRSNT: CPT | Mod: ,,,

## 2025-04-01 PROCEDURE — 3008F BODY MASS INDEX DOCD: CPT | Mod: ,,,

## 2025-04-01 PROCEDURE — 1159F MED LIST DOCD IN RCRD: CPT | Mod: ,,,

## 2025-04-01 PROCEDURE — 3288F FALL RISK ASSESSMENT DOCD: CPT | Mod: ,,,

## 2025-04-01 PROCEDURE — 4010F ACE/ARB THERAPY RXD/TAKEN: CPT | Mod: ,,,

## 2025-04-01 PROCEDURE — 3079F DIAST BP 80-89 MM HG: CPT | Mod: ,,,

## 2025-04-01 PROCEDURE — 99213 OFFICE O/P EST LOW 20 MIN: CPT | Mod: ,,,

## 2025-04-01 NOTE — ASSESSMENT & PLAN NOTE
Start drinking protein shakes twice a day  Start omega three fish oil to help with appetite  Small frequent meals throughout the day  Healthy snacks  Walk daily  Will try megace after that if nothing helps, but not while she is gaining weight. She has gained one pound since her last visit. She is underweight.

## 2025-04-01 NOTE — PROGRESS NOTES
HPI:   Arlette Cisneros is a pleasant 64 y.o. patient who reports to clinic with complaints of Decreased appetite and weight loss. She states whens he fixes something to eat she will eat a few bites and then be done with it. She states that she has never had a big appetite. She states she is not as active as she used to be, and thinks she is losing weight. She denies any shortness of breath or chest pain. She denies any other issues. She has gained one pound since her last visit in march.                  Past Medical History:   Diagnosis Date    Anemia     Cervical radiculopathy 12/23/2018    Degenerative joint disease involving multiple joints 12/21/2020    Hypercholesteremia 03/23/2021    Hypertension     Restrictive lung disease 03/23/2021    Temporomandibular joint disorder 04/30/2018       PAST SURGICAL HISTORY:   Past Surgical History:   Procedure Laterality Date    APPENDECTOMY      HYSTERECTOMY         MEDICATIONS:  Current Medications[1]    ALLERGIES:   Review of patient's allergies indicates:   Allergen Reactions    Codeine sulfate Shortness Of Breath         Review of Systems   Constitutional: Negative.  Negative for activity change, appetite change and fatigue.   HENT: Negative.  Negative for drooling, nosebleeds and trouble swallowing.    Eyes: Negative.  Negative for pain and visual disturbance.   Respiratory: Negative.  Negative for chest tightness and shortness of breath.    Cardiovascular: Negative.  Negative for chest pain and palpitations.   Gastrointestinal: Negative.  Negative for change in bowel habit.   Endocrine: Negative.    Genitourinary: Negative.  Negative for difficulty urinating and dysuria.   Musculoskeletal: Negative.  Negative for arthralgias and myalgias.   Integumentary:  Negative for rash and wound. Negative.   Allergic/Immunologic: Negative.    Neurological: Negative.  Negative for dizziness, weakness and headaches.   Hematological: Negative.    Psychiatric/Behavioral: Negative.  " The patient is not nervous/anxious.    All other systems reviewed and are negative.         Physical Exam  Constitutional:       General: She is not in acute distress.     Appearance: Normal appearance. She is well-developed. She is not ill-appearing.   HENT:      Head: Normocephalic and atraumatic.      Right Ear: Tympanic membrane normal.      Left Ear: Tympanic membrane normal.      Nose: Nose normal.      Mouth/Throat:      Mouth: Mucous membranes are moist.      Pharynx: Oropharynx is clear. No posterior oropharyngeal erythema.   Cardiovascular:      Rate and Rhythm: Normal rate and regular rhythm.      Pulses: Normal pulses.      Heart sounds: Normal heart sounds.   Pulmonary:      Effort: Pulmonary effort is normal. No accessory muscle usage or respiratory distress.      Breath sounds: Normal breath sounds.   Abdominal:      General: Abdomen is flat. Bowel sounds are normal. There is no distension.      Palpations: Abdomen is soft.      Tenderness: There is no abdominal tenderness.   Musculoskeletal:         General: Normal range of motion.      Cervical back: Normal range of motion.   Skin:     General: Skin is warm and dry.      Capillary Refill: Capillary refill takes less than 2 seconds.   Neurological:      Mental Status: She is alert and oriented to person, place, and time. Mental status is at baseline.   Psychiatric:         Mood and Affect: Mood normal.         Speech: Speech normal.         Behavior: Behavior normal. Behavior is cooperative.         Thought Content: Thought content normal.          VITAL SIGNS:   BP (!) 142/82 (BP Location: Right arm, Patient Position: Sitting) Comment: pt did not take BP meds this AM  Pulse 74   Temp 98.4 °F (36.9 °C) (Oral)   Resp 18   Ht 5' 6" (1.676 m)   Wt 48.1 kg (106 lb)   SpO2 98%   BMI 17.11 kg/m²       ASSESSMENT/PLAN  1. Decreased appetite  Assessment & Plan:  Start drinking protein shakes twice a day  Start omega three fish oil to help with " appetite  Small frequent meals throughout the day  Healthy snacks  Walk daily  Will try megace after that if nothing helps, but not while she is gaining weight. She has gained one pound since her last visit. She is underweight.       2. Weight loss  Assessment & Plan:  Start drinking protein shakes twice a day  Start omega three fish oil to help with appetite  Small frequent meals throughout the day  Healthy snacks  Walk daily  Will try megace after that if nothing helps, but not while she is gaining weight. She has gained one pound since her last visit. She is underweight.                There are no Patient Instructions on file for this visit.  No orders of the defined types were placed in this encounter.               [1]   Current Outpatient Medications:     albuterol (VENTOLIN HFA) 90 mcg/actuation inhaler, Inhale 2 puffs into the lungs every 6 (six) hours as needed for Wheezing. Rescue, Disp: 18 g, Rfl: 5    amLODIPine (NORVASC) 10 MG tablet, Take 1 tablet (10 mg total) by mouth once daily., Disp: 90 tablet, Rfl: 1    ciclopirox (PENLAC) 8 % Soln, Apply topically every evening., Disp: , Rfl:     diclofenac (VOLTAREN) 25 MG TbEC, Take 1 tablet (25 mg total) by mouth once daily., Disp: 30 tablet, Rfl: 3    famotidine (PEPCID) 40 MG tablet, Take 1 tablet (40 mg total) by mouth once daily., Disp: 90 tablet, Rfl: 1    fluticasone propionate (FLONASE) 50 mcg/actuation nasal spray, 1 spray (50 mcg total) by Each Nostril route once daily., Disp: 11.1 mL, Rfl: 0    losartan (COZAAR) 50 MG tablet, Take 1 tablet (50 mg total) by mouth once daily., Disp: 90 tablet, Rfl: 1    omeprazole (PRILOSEC) 20 MG capsule, Take 20 mg by mouth once daily., Disp: , Rfl:     rosuvastatin (CRESTOR) 20 MG tablet, Take 1 tablet (20 mg total) by mouth once daily., Disp: 90 tablet, Rfl: 1

## 2025-04-02 VITALS
TEMPERATURE: 98 F | SYSTOLIC BLOOD PRESSURE: 138 MMHG | HEART RATE: 74 BPM | WEIGHT: 106 LBS | BODY MASS INDEX: 17.04 KG/M2 | OXYGEN SATURATION: 98 % | RESPIRATION RATE: 18 BRPM | DIASTOLIC BLOOD PRESSURE: 79 MMHG | HEIGHT: 66 IN

## 2025-07-25 ENCOUNTER — OFFICE VISIT (OUTPATIENT)
Dept: FAMILY MEDICINE | Facility: CLINIC | Age: 65
End: 2025-07-25
Payer: MEDICARE

## 2025-07-25 VITALS
BODY MASS INDEX: 16.61 KG/M2 | DIASTOLIC BLOOD PRESSURE: 88 MMHG | HEART RATE: 75 BPM | WEIGHT: 103.38 LBS | RESPIRATION RATE: 18 BRPM | SYSTOLIC BLOOD PRESSURE: 138 MMHG | HEIGHT: 66 IN | OXYGEN SATURATION: 98 % | TEMPERATURE: 98 F

## 2025-07-25 DIAGNOSIS — K21.9 GASTROESOPHAGEAL REFLUX DISEASE WITHOUT ESOPHAGITIS: ICD-10-CM

## 2025-07-25 DIAGNOSIS — M19.90 ARTHRITIS: ICD-10-CM

## 2025-07-25 DIAGNOSIS — Z12.11 COLON CANCER SCREENING: ICD-10-CM

## 2025-07-25 DIAGNOSIS — J44.9 CHRONIC OBSTRUCTIVE PULMONARY DISEASE, UNSPECIFIED COPD TYPE: ICD-10-CM

## 2025-07-25 DIAGNOSIS — R09.81 NASAL CONGESTION: ICD-10-CM

## 2025-07-25 DIAGNOSIS — E78.2 MIXED HYPERLIPIDEMIA: ICD-10-CM

## 2025-07-25 DIAGNOSIS — Z12.11 COLON CANCER SCREENING: Primary | ICD-10-CM

## 2025-07-25 DIAGNOSIS — N39.0 URINARY TRACT INFECTION WITHOUT HEMATURIA, SITE UNSPECIFIED: ICD-10-CM

## 2025-07-25 DIAGNOSIS — Z12.39 ENCOUNTER FOR SCREENING FOR MALIGNANT NEOPLASM OF BREAST, UNSPECIFIED SCREENING MODALITY: ICD-10-CM

## 2025-07-25 DIAGNOSIS — R06.02 SOB (SHORTNESS OF BREATH): ICD-10-CM

## 2025-07-25 DIAGNOSIS — R30.0 DYSURIA: ICD-10-CM

## 2025-07-25 DIAGNOSIS — I10 HYPERTENSION, UNSPECIFIED TYPE: Primary | ICD-10-CM

## 2025-07-25 LAB
ALBUMIN SERPL BCP-MCNC: 3.9 G/DL (ref 3.4–4.8)
ALBUMIN/GLOB SERPL: 1.1 {RATIO}
ALP SERPL-CCNC: 87 U/L (ref 40–150)
ALT SERPL W P-5'-P-CCNC: 49 U/L
ANION GAP SERPL CALCULATED.3IONS-SCNC: 10 MMOL/L (ref 7–16)
AST SERPL W P-5'-P-CCNC: 44 U/L (ref 11–45)
BASOPHILS # BLD AUTO: 0.04 K/UL (ref 0–0.2)
BASOPHILS NFR BLD AUTO: 1.3 % (ref 0–1)
BILIRUB SERPL-MCNC: 0.6 MG/DL
BILIRUB SERPL-MCNC: NEGATIVE MG/DL
BLOOD URINE, POC: NEGATIVE
BUN SERPL-MCNC: 12 MG/DL (ref 10–20)
BUN/CREAT SERPL: 21 (ref 6–20)
CALCIUM SERPL-MCNC: 8.4 MG/DL (ref 8.4–10.2)
CHLORIDE SERPL-SCNC: 109 MMOL/L (ref 98–107)
CHOLEST SERPL-MCNC: 174 MG/DL
CHOLEST/HDLC SERPL: 2.9 {RATIO}
CLARITY, UA: ABNORMAL
CO2 SERPL-SCNC: 27 MMOL/L (ref 23–31)
COLOR, UA: YELLOW
CREAT SERPL-MCNC: 0.58 MG/DL (ref 0.55–1.02)
DIFFERENTIAL METHOD BLD: ABNORMAL
EGFR (NO RACE VARIABLE) (RUSH/TITUS): 100 ML/MIN/1.73M2
EOSINOPHIL # BLD AUTO: 0.14 K/UL (ref 0–0.5)
EOSINOPHIL NFR BLD AUTO: 4.6 % (ref 1–4)
ERYTHROCYTE [DISTWIDTH] IN BLOOD BY AUTOMATED COUNT: 13.7 % (ref 11.5–14.5)
GLOBULIN SER-MCNC: 3.4 G/DL (ref 2–4)
GLUCOSE SERPL-MCNC: 98 MG/DL (ref 82–115)
GLUCOSE UR QL STRIP: NEGATIVE
HCT VFR BLD AUTO: 44 % (ref 38–47)
HDLC SERPL-MCNC: 61 MG/DL (ref 35–60)
HGB BLD-MCNC: 13.9 G/DL (ref 12–16)
IMM GRANULOCYTES # BLD AUTO: 0.02 K/UL (ref 0–0.04)
IMM GRANULOCYTES NFR BLD: 0.7 % (ref 0–0.4)
KETONES UR QL STRIP: NEGATIVE
LDLC SERPL CALC-MCNC: 96 MG/DL
LDLC/HDLC SERPL: 1.6 {RATIO}
LEUKOCYTE ESTERASE URINE, POC: ABNORMAL
LYMPHOCYTES # BLD AUTO: 0.89 K/UL (ref 1–4.8)
LYMPHOCYTES NFR BLD AUTO: 29.1 % (ref 27–41)
MCH RBC QN AUTO: 29.5 PG (ref 27–31)
MCHC RBC AUTO-ENTMCNC: 31.6 G/DL (ref 32–36)
MCV RBC AUTO: 93.4 FL (ref 80–96)
MONOCYTES # BLD AUTO: 0.38 K/UL (ref 0–0.8)
MONOCYTES NFR BLD AUTO: 12.4 % (ref 2–6)
MPC BLD CALC-MCNC: 11.1 FL (ref 9.4–12.4)
NEUTROPHILS # BLD AUTO: 1.59 K/UL (ref 1.8–7.7)
NEUTROPHILS NFR BLD AUTO: 51.9 % (ref 53–65)
NITRITE, POC UA: POSITIVE
NONHDLC SERPL-MCNC: 113 MG/DL
NRBC # BLD AUTO: 0 X10E3/UL
NRBC, AUTO (.00): 0 %
PH, POC UA: 6
PLATELET # BLD AUTO: 220 K/UL (ref 150–400)
POTASSIUM SERPL-SCNC: 4.1 MMOL/L (ref 3.5–5.1)
PROT SERPL-MCNC: 7.3 G/DL (ref 5.8–7.6)
PROTEIN, POC: ABNORMAL
RBC # BLD AUTO: 4.71 M/UL (ref 4.2–5.4)
SODIUM SERPL-SCNC: 142 MMOL/L (ref 136–145)
SPECIFIC GRAVITY, POC UA: 1.02
TRIGL SERPL-MCNC: 86 MG/DL (ref 37–140)
UROBILINOGEN, POC UA: ABNORMAL
VLDLC SERPL-MCNC: 17 MG/DL
WBC # BLD AUTO: 3.06 K/UL (ref 4.5–11)

## 2025-07-25 RX ORDER — ROSUVASTATIN CALCIUM 10 MG/1
10 TABLET, COATED ORAL DAILY
COMMUNITY
Start: 2025-07-21 | End: 2025-07-25 | Stop reason: SDUPTHER

## 2025-07-25 RX ORDER — FAMOTIDINE 40 MG/1
40 TABLET, FILM COATED ORAL DAILY
Qty: 90 TABLET | Refills: 1 | Status: SHIPPED | OUTPATIENT
Start: 2025-07-25

## 2025-07-25 RX ORDER — OMEPRAZOLE 20 MG/1
20 CAPSULE, DELAYED RELEASE ORAL DAILY
Qty: 90 CAPSULE | Refills: 1 | Status: CANCELLED | OUTPATIENT
Start: 2025-07-25

## 2025-07-25 RX ORDER — PRAVASTATIN SODIUM 20 MG/1
20 TABLET ORAL DAILY
Qty: 90 TABLET | Refills: 1 | Status: CANCELLED | OUTPATIENT
Start: 2025-07-25

## 2025-07-25 RX ORDER — AMLODIPINE BESYLATE 10 MG/1
10 TABLET ORAL DAILY
Qty: 90 TABLET | Refills: 1 | Status: SHIPPED | OUTPATIENT
Start: 2025-07-25 | End: 2026-07-25

## 2025-07-25 RX ORDER — DICLOFENAC SODIUM 25 MG/1
25 TABLET, DELAYED RELEASE ORAL DAILY
Qty: 90 TABLET | Refills: 1 | Status: SHIPPED | OUTPATIENT
Start: 2025-07-25

## 2025-07-25 RX ORDER — PRAVASTATIN SODIUM 20 MG/1
20 TABLET ORAL DAILY
COMMUNITY
End: 2025-07-25

## 2025-07-25 RX ORDER — ROSUVASTATIN CALCIUM 10 MG/1
10 TABLET, COATED ORAL DAILY
Qty: 90 TABLET | Refills: 1 | Status: SHIPPED | OUTPATIENT
Start: 2025-07-25

## 2025-07-25 RX ORDER — FLUTICASONE PROPIONATE 50 MCG
1 SPRAY, SUSPENSION (ML) NASAL DAILY
Qty: 11.1 ML | Refills: 0 | Status: SHIPPED | OUTPATIENT
Start: 2025-07-25

## 2025-07-25 RX ORDER — NITROFURANTOIN 25; 75 MG/1; MG/1
100 CAPSULE ORAL 2 TIMES DAILY
Qty: 14 CAPSULE | Refills: 0 | Status: SHIPPED | OUTPATIENT
Start: 2025-07-25

## 2025-07-25 RX ORDER — ALBUTEROL SULFATE 90 UG/1
2 INHALANT RESPIRATORY (INHALATION) EVERY 6 HOURS PRN
Qty: 18 G | Refills: 5 | Status: SHIPPED | OUTPATIENT
Start: 2025-07-25

## 2025-07-25 RX ORDER — LOSARTAN POTASSIUM 50 MG/1
50 TABLET ORAL DAILY
Qty: 90 TABLET | Refills: 1 | Status: SHIPPED | OUTPATIENT
Start: 2025-07-25

## 2025-07-25 NOTE — ASSESSMENT & PLAN NOTE
-Macrobid BID for 7 days  -Increase fluid intake.   -Take meds as prescribed.   -Complete all antibiotics until they are gone even if starting to feel better. Take all antibiotic pills until they are gone as prescribed.   -Use preventative measures such as wiping from front to back, urination after sexual intercourse.   -If constipated take stool softener daily if tolerated.    -Will call patient with urine culture results.

## 2025-07-25 NOTE — ASSESSMENT & PLAN NOTE
Hypertension is controlled at this time. Continue current medication, is effective at this time. Return to the clinic as needed.   -Low salt, low sodium diet, less fried foods, more baked foods, more green leafy vegetables, more fruits, less bread  -CBC, CMP today. Will call with results.   -Exercise at least 30-45 minutes a day  -Follow up in 6 months.    She has not taken her meds this morning, first reading was elevated.    2/12/2025  Hypertension is controlled today. She usually takes Norvasc 10mg and Losartan. We increased her NORVASC to 10 mg on last visit. Blood pressure is much improved. She states it is usually controlled. Explained importance of medication adherence.   -Low salt, low sodium diet, less fried foods, more baked foods, more green leafy vegetables, more fruits, less bread  - CMP today. Will call with results.   -Exercise at least 30-45 minutes a day  -Follow up in 6 months.

## 2025-07-25 NOTE — PROGRESS NOTES
HPI:   Arlette Cisneros is a pleasant 64 y.o. patient who reports to clinic with complaints of medication refills and lab work. This is here six month follow up. She denies any new complaints. She denies any shortness of breath or chest pain. She is willing to get set up for her C scope at this time. She declines other care gaps. She did not take her blood pressure medications today due to her fasting for lab and needing something to eat with them. She will take her blood pressure meds when she gets home. She also states she has some hurting when she urinates that has been going on for a little while. She is unsure how long but a few days.          Past Medical History:   Diagnosis Date    Anemia     Cervical radiculopathy 12/23/2018    Degenerative joint disease involving multiple joints 12/21/2020    Hypercholesteremia 03/23/2021    Hypertension     Restrictive lung disease 03/23/2021    Temporomandibular joint disorder 04/30/2018       PAST SURGICAL HISTORY:   Past Surgical History:   Procedure Laterality Date    APPENDECTOMY      HYSTERECTOMY         MEDICATIONS:  Current Medications[1]    ALLERGIES:   Review of patient's allergies indicates:   Allergen Reactions    Codeine sulfate Shortness Of Breath         Review of Systems   Constitutional: Negative.  Negative for activity change, chills and fever.   HENT: Negative.  Negative for drooling and nosebleeds.    Eyes: Negative.    Respiratory: Negative.  Negative for chest tightness.    Cardiovascular: Negative.  Negative for chest pain and palpitations.   Gastrointestinal: Negative.    Endocrine: Negative.    Genitourinary:  Positive for dysuria. Negative for difficulty urinating.   Musculoskeletal: Negative.    Integumentary:  Negative.   Allergic/Immunologic: Negative.    Neurological: Negative.  Negative for dizziness.   Hematological: Negative.    Psychiatric/Behavioral: Negative.     All other systems reviewed and are negative.         Physical  "Exam  Constitutional:       General: She is not in acute distress.     Appearance: Normal appearance. She is well-developed. She is not ill-appearing.   HENT:      Head: Normocephalic and atraumatic.      Right Ear: Tympanic membrane normal.      Left Ear: Tympanic membrane normal.      Nose: Nose normal.      Mouth/Throat:      Mouth: Mucous membranes are moist.      Pharynx: Oropharynx is clear. No posterior oropharyngeal erythema.   Cardiovascular:      Rate and Rhythm: Normal rate and regular rhythm.      Pulses: Normal pulses.      Heart sounds: Normal heart sounds.   Pulmonary:      Effort: Pulmonary effort is normal. No accessory muscle usage or respiratory distress.      Breath sounds: Normal breath sounds.   Abdominal:      General: Abdomen is flat. Bowel sounds are normal. There is no distension.      Palpations: Abdomen is soft.      Tenderness: There is no abdominal tenderness.   Musculoskeletal:         General: Normal range of motion.      Cervical back: Normal range of motion.   Skin:     General: Skin is warm and dry.      Capillary Refill: Capillary refill takes less than 2 seconds.   Neurological:      Mental Status: She is alert and oriented to person, place, and time. Mental status is at baseline.   Psychiatric:         Mood and Affect: Mood normal.         Speech: Speech normal.         Behavior: Behavior normal. Behavior is cooperative.         Thought Content: Thought content normal.          VITAL SIGNS:   /88   Pulse 75   Temp 97.6 °F (36.4 °C) (Oral)   Resp 18   Ht 5' 6" (1.676 m)   Wt 46.9 kg (103 lb 6.4 oz)   SpO2 98%   BMI 16.69 kg/m²       ASSESSMENT/PLAN  1. Hypertension, unspecified type  Assessment & Plan:  Hypertension is controlled at this time. Continue current medication, is effective at this time. Return to the clinic as needed.   -Low salt, low sodium diet, less fried foods, more baked foods, more green leafy vegetables, more fruits, less bread  -CBC, CMP today. " Will call with results.   -Exercise at least 30-45 minutes a day  -Follow up in 6 months.    She has not taken her meds this morning, first reading was elevated.    2/12/2025  Hypertension is controlled today. She usually takes Norvasc 10mg and Losartan. We increased her NORVASC to 10 mg on last visit. Blood pressure is much improved. She states it is usually controlled. Explained importance of medication adherence.   -Low salt, low sodium diet, less fried foods, more baked foods, more green leafy vegetables, more fruits, less bread  - CMP today. Will call with results.   -Exercise at least 30-45 minutes a day  -Follow up in 6 months.       Orders:  -     CBC Auto Differential; Future; Expected date: 07/25/2025  -     Comprehensive Metabolic Panel; Future; Expected date: 07/25/2025  -     Lipid Panel; Future; Expected date: 07/25/2025  -     amLODIPine (NORVASC) 10 MG tablet; Take 1 tablet (10 mg total) by mouth once daily.  Dispense: 90 tablet; Refill: 1  -     losartan (COZAAR) 50 MG tablet; Take 1 tablet (50 mg total) by mouth once daily.  Dispense: 90 tablet; Refill: 1    2. SOB (shortness of breath)  Assessment & Plan:  Patient currently using Trelegy and Ventolin with great improvement in breathing     Orders:  -     albuterol (VENTOLIN HFA) 90 mcg/actuation inhaler; Inhale 2 puffs into the lungs every 6 (six) hours as needed for Wheezing. Rescue  Dispense: 18 g; Refill: 5    3. Arthritis  Assessment & Plan:  Continue current medication, is effective at this time. Return to the clinic as needed.       Orders:  -     diclofenac (VOLTAREN) 25 MG TbEC; Take 1 tablet (25 mg total) by mouth once daily.  Dispense: 90 tablet; Refill: 1    4. Nasal congestion  Assessment & Plan:  Flonase as needed     Orders:  -     fluticasone propionate (FLONASE) 50 mcg/actuation nasal spray; 1 spray (50 mcg total) by Each Nostril route once daily.  Dispense: 11.1 mL; Refill: 0    5. Gastroesophageal reflux disease without  esophagitis  Assessment & Plan:  Continue current medication, is effective at this time. Return to the clinic as needed.   -Avoid spicy, greasy foods  -Avoid caffeine, citric acid, chocolate, peppermint, and carbonated drinks  -Do not lay down within 3 hours of eating  -Exercise 150 minutes per week  -Increase fluid to 64 ounces daily  -Avoid antiinflammatory medications such as motrin, advil, aleve, ibuprofen, and BC powder      Orders:  -     famotidine (PEPCID) 40 MG tablet; Take 1 tablet (40 mg total) by mouth once daily.  Dispense: 90 tablet; Refill: 1    6. Mixed hyperlipidemia  Assessment & Plan:  Hyperlipidemia is controlled. Continue current medication, is effective at this time. Return to the clinic as needed.   Low fat, low chol diet, less fried foods, more baked foods, more vegetables.   Exercise daily  Take medications as ordered for hyperlipidemia  Return to the clinic as needed   Follow up in 6 months   Lipids panel today ,will call with results and adjust medications accordingly.      Orders:  -     rosuvastatin (CRESTOR) 10 MG tablet; Take 1 tablet (10 mg total) by mouth once daily.  Dispense: 90 tablet; Refill: 1    7. Colon cancer screening  Assessment & Plan:  Ordered in computer.     Orders:  -     Colonoscopy; Future; Expected date: 07/25/2025    8. Chronic obstructive pulmonary disease, unspecified COPD type  Assessment & Plan:  Continue current medication, is effective at this time. Return to the clinic as needed.         9. Encounter for screening for malignant neoplasm of breast, unspecified screening modality  Assessment & Plan:  Mammogram ordered today. Efrainbeths will call her     Orders:  -     Mammo Digital Screening Bilat w/ Roland (XPD); Future; Expected date: 07/25/2025    10. Dysuria  Assessment & Plan:  UA ordered today    Orders:  -     POCT URINALYSIS W/O SCOPE    11. Urinary tract infection without hematuria, site unspecified  Assessment & Plan:  -Macrobid BID for 7 days  -Increase  fluid intake.   -Take meds as prescribed.   -Complete all antibiotics until they are gone even if starting to feel better. Take all antibiotic pills until they are gone as prescribed.   -Use preventative measures such as wiping from front to back, urination after sexual intercourse.   -If constipated take stool softener daily if tolerated.    -Will call patient with urine culture results.       Orders:  -     nitrofurantoin, macrocrystal-monohydrate, (MACROBID) 100 MG capsule; Take 1 capsule (100 mg total) by mouth 2 (two) times daily.  Dispense: 14 capsule; Refill: 0  -     Urine Culture High Risk ($$)             There are no Patient Instructions on file for this visit.  Orders Placed This Encounter   Procedures    Urine Culture High Risk ($$)           Send normal result to authorizing provider's In Basket if patient is active on MyChart::   Yes    Mammo Digital Screening Bilat w/ Roland (XPD)     Standing Status:   Future     Expected Date:   7/25/2025     Expiration Date:   7/25/2027     May the Radiologist modify the order per protocol to meet the clinical needs of the patient?:   Yes     Release to patient:   Immediate    CBC Auto Differential     Standing Status:   Future     Number of Occurrences:   1     Expected Date:   7/25/2025     Expiration Date:   10/23/2026     Send normal result to authorizing provider's In Basket if patient is active on MyChart::   Yes    Comprehensive Metabolic Panel     Standing Status:   Future     Number of Occurrences:   1     Expected Date:   7/25/2025     Expiration Date:   10/23/2026     Send normal result to authorizing provider's In Basket if patient is active on MyChart::   Yes    Lipid Panel     Standing Status:   Future     Number of Occurrences:   1     Expected Date:   7/25/2025     Expiration Date:   10/23/2026     Send normal result to authorizing provider's In Basket if patient is active on MyChart::   Yes    CBC with Differential     Send normal result to  authorizing provider's In Basket if patient is active on MyChart::   Yes                [1]   Current Outpatient Medications:     ciclopirox (PENLAC) 8 % Soln, Apply topically every evening., Disp: , Rfl:     albuterol (VENTOLIN HFA) 90 mcg/actuation inhaler, Inhale 2 puffs into the lungs every 6 (six) hours as needed for Wheezing. Rescue, Disp: 18 g, Rfl: 5    amLODIPine (NORVASC) 10 MG tablet, Take 1 tablet (10 mg total) by mouth once daily., Disp: 90 tablet, Rfl: 1    diclofenac (VOLTAREN) 25 MG TbEC, Take 1 tablet (25 mg total) by mouth once daily., Disp: 90 tablet, Rfl: 1    famotidine (PEPCID) 40 MG tablet, Take 1 tablet (40 mg total) by mouth once daily., Disp: 90 tablet, Rfl: 1    fluticasone propionate (FLONASE) 50 mcg/actuation nasal spray, 1 spray (50 mcg total) by Each Nostril route once daily., Disp: 11.1 mL, Rfl: 0    losartan (COZAAR) 50 MG tablet, Take 1 tablet (50 mg total) by mouth once daily., Disp: 90 tablet, Rfl: 1    nitrofurantoin, macrocrystal-monohydrate, (MACROBID) 100 MG capsule, Take 1 capsule (100 mg total) by mouth 2 (two) times daily., Disp: 14 capsule, Rfl: 0    rosuvastatin (CRESTOR) 10 MG tablet, Take 1 tablet (10 mg total) by mouth once daily., Disp: 90 tablet, Rfl: 1

## 2025-07-26 LAB — UA COMPLETE W REFLEX CULTURE PNL UR: ABNORMAL

## 2025-08-27 ENCOUNTER — PATIENT OUTREACH (OUTPATIENT)
Facility: HOSPITAL | Age: 65
End: 2025-08-27
Payer: MEDICARE

## 2025-08-27 ENCOUNTER — TELEPHONE (OUTPATIENT)
Dept: FAMILY MEDICINE | Facility: CLINIC | Age: 65
End: 2025-08-27
Payer: MEDICARE

## 2025-08-29 ENCOUNTER — PATIENT OUTREACH (OUTPATIENT)
Facility: HOSPITAL | Age: 65
End: 2025-08-29
Payer: MEDICARE